# Patient Record
Sex: FEMALE | Race: WHITE | Employment: OTHER | URBAN - METROPOLITAN AREA
[De-identification: names, ages, dates, MRNs, and addresses within clinical notes are randomized per-mention and may not be internally consistent; named-entity substitution may affect disease eponyms.]

---

## 2020-11-22 ENCOUNTER — APPOINTMENT (OUTPATIENT)
Dept: CT IMAGING | Facility: CLINIC | Age: 75
DRG: 064 | End: 2020-11-22
Attending: EMERGENCY MEDICINE
Payer: COMMERCIAL

## 2020-11-22 ENCOUNTER — HOSPITAL ENCOUNTER (INPATIENT)
Facility: CLINIC | Age: 75
LOS: 7 days | Discharge: NURSING, REHAB OR LONG-TERM CARE | DRG: 064 | End: 2020-11-30
Attending: EMERGENCY MEDICINE | Admitting: INTERNAL MEDICINE
Payer: COMMERCIAL

## 2020-11-22 DIAGNOSIS — I63.9 CEREBROVASCULAR ACCIDENT (CVA), UNSPECIFIED MECHANISM (H): ICD-10-CM

## 2020-11-22 DIAGNOSIS — I48.91 ATRIAL FIBRILLATION WITH RVR (H): ICD-10-CM

## 2020-11-22 LAB
ALBUMIN SERPL-MCNC: 4.1 G/DL (ref 3.4–5)
ALP SERPL-CCNC: 47 U/L (ref 40–150)
ALT SERPL W P-5'-P-CCNC: 24 U/L (ref 0–50)
ANION GAP SERPL CALCULATED.3IONS-SCNC: 10 MMOL/L (ref 3–14)
AST SERPL W P-5'-P-CCNC: 32 U/L (ref 0–45)
BASOPHILS # BLD AUTO: 0 10E9/L (ref 0–0.2)
BASOPHILS NFR BLD AUTO: 0.1 %
BILIRUB SERPL-MCNC: 2 MG/DL (ref 0.2–1.3)
BUN SERPL-MCNC: 18 MG/DL (ref 7–30)
CALCIUM SERPL-MCNC: 9.2 MG/DL (ref 8.5–10.1)
CHLORIDE SERPL-SCNC: 107 MMOL/L (ref 94–109)
CK SERPL-CCNC: 659 U/L (ref 30–225)
CO2 SERPL-SCNC: 22 MMOL/L (ref 20–32)
CREAT SERPL-MCNC: 0.84 MG/DL (ref 0.52–1.04)
DIFFERENTIAL METHOD BLD: NORMAL
EOSINOPHIL # BLD AUTO: 0 10E9/L (ref 0–0.7)
EOSINOPHIL NFR BLD AUTO: 0 %
ERYTHROCYTE [DISTWIDTH] IN BLOOD BY AUTOMATED COUNT: 13.7 % (ref 10–15)
GFR SERPL CREATININE-BSD FRML MDRD: 68 ML/MIN/{1.73_M2}
GLUCOSE SERPL-MCNC: 147 MG/DL (ref 70–99)
HCT VFR BLD AUTO: 40.8 % (ref 35–47)
HGB BLD-MCNC: 13.8 G/DL (ref 11.7–15.7)
IMM GRANULOCYTES # BLD: 0 10E9/L (ref 0–0.4)
IMM GRANULOCYTES NFR BLD: 0.2 %
INR PPP: 1.14 (ref 0.86–1.14)
LYMPHOCYTES # BLD AUTO: 0.8 10E9/L (ref 0.8–5.3)
LYMPHOCYTES NFR BLD AUTO: 7.8 %
MCH RBC QN AUTO: 31.5 PG (ref 26.5–33)
MCHC RBC AUTO-ENTMCNC: 33.8 G/DL (ref 31.5–36.5)
MCV RBC AUTO: 93 FL (ref 78–100)
MONOCYTES # BLD AUTO: 0.7 10E9/L (ref 0–1.3)
MONOCYTES NFR BLD AUTO: 7.7 %
NEUTROPHILS # BLD AUTO: 8.1 10E9/L (ref 1.6–8.3)
NEUTROPHILS NFR BLD AUTO: 84.2 %
NRBC # BLD AUTO: 0 10*3/UL
NRBC BLD AUTO-RTO: 0 /100
PLATELET # BLD AUTO: 219 10E9/L (ref 150–450)
POTASSIUM SERPL-SCNC: 4.1 MMOL/L (ref 3.4–5.3)
PROT SERPL-MCNC: 7.2 G/DL (ref 6.8–8.8)
RBC # BLD AUTO: 4.38 10E12/L (ref 3.8–5.2)
SODIUM SERPL-SCNC: 139 MMOL/L (ref 133–144)
TROPONIN I SERPL-MCNC: <0.015 UG/L (ref 0–0.04)
WBC # BLD AUTO: 9.6 10E9/L (ref 4–11)

## 2020-11-22 PROCEDURE — 250N000009 HC RX 250: Performed by: EMERGENCY MEDICINE

## 2020-11-22 PROCEDURE — 96361 HYDRATE IV INFUSION ADD-ON: CPT

## 2020-11-22 PROCEDURE — 82550 ASSAY OF CK (CPK): CPT | Performed by: EMERGENCY MEDICINE

## 2020-11-22 PROCEDURE — 99285 EMERGENCY DEPT VISIT HI MDM: CPT | Mod: 25

## 2020-11-22 PROCEDURE — 70498 CT ANGIOGRAPHY NECK: CPT

## 2020-11-22 PROCEDURE — 0042T CT HEAD PERFUSION WITH CONTRAST: CPT

## 2020-11-22 PROCEDURE — 93005 ELECTROCARDIOGRAM TRACING: CPT

## 2020-11-22 PROCEDURE — 84484 ASSAY OF TROPONIN QUANT: CPT | Performed by: EMERGENCY MEDICINE

## 2020-11-22 PROCEDURE — 96376 TX/PRO/DX INJ SAME DRUG ADON: CPT | Mod: 59

## 2020-11-22 PROCEDURE — 85610 PROTHROMBIN TIME: CPT | Performed by: EMERGENCY MEDICINE

## 2020-11-22 PROCEDURE — 80053 COMPREHEN METABOLIC PANEL: CPT | Performed by: EMERGENCY MEDICINE

## 2020-11-22 PROCEDURE — 85025 COMPLETE CBC W/AUTO DIFF WBC: CPT | Performed by: EMERGENCY MEDICINE

## 2020-11-22 PROCEDURE — 70450 CT HEAD/BRAIN W/O DYE: CPT

## 2020-11-22 PROCEDURE — 258N000003 HC RX IP 258 OP 636: Performed by: EMERGENCY MEDICINE

## 2020-11-22 PROCEDURE — 250N000011 HC RX IP 250 OP 636: Performed by: EMERGENCY MEDICINE

## 2020-11-22 RX ORDER — IOPAMIDOL 755 MG/ML
120 INJECTION, SOLUTION INTRAVASCULAR ONCE
Status: COMPLETED | OUTPATIENT
Start: 2020-11-22 | End: 2020-11-22

## 2020-11-22 RX ORDER — SODIUM CHLORIDE 9 MG/ML
INJECTION, SOLUTION INTRAVENOUS CONTINUOUS
Status: DISCONTINUED | OUTPATIENT
Start: 2020-11-23 | End: 2020-11-25

## 2020-11-22 RX ORDER — SIMVASTATIN 40 MG
40 TABLET ORAL AT BEDTIME
Status: ON HOLD | COMMUNITY
Start: 2020-07-14 | End: 2020-11-30

## 2020-11-22 RX ORDER — ASPIRIN 300 MG/1
300 SUPPOSITORY RECTAL ONCE
Status: COMPLETED | OUTPATIENT
Start: 2020-11-22 | End: 2020-11-23

## 2020-11-22 RX ORDER — DILTIAZEM HYDROCHLORIDE 5 MG/ML
10 INJECTION INTRAVENOUS ONCE
Status: COMPLETED | OUTPATIENT
Start: 2020-11-22 | End: 2020-11-22

## 2020-11-22 RX ADMIN — SODIUM CHLORIDE 1000 ML: 9 INJECTION, SOLUTION INTRAVENOUS at 23:43

## 2020-11-22 RX ADMIN — DILTIAZEM HYDROCHLORIDE 10 MG: 5 INJECTION INTRAVENOUS at 23:55

## 2020-11-22 RX ADMIN — SODIUM CHLORIDE 100 ML: 9 INJECTION, SOLUTION INTRAVENOUS at 22:45

## 2020-11-22 RX ADMIN — IOPAMIDOL 120 ML: 755 INJECTION, SOLUTION INTRAVENOUS at 22:45

## 2020-11-23 ENCOUNTER — APPOINTMENT (OUTPATIENT)
Dept: MRI IMAGING | Facility: CLINIC | Age: 75
DRG: 064 | End: 2020-11-23
Attending: INTERNAL MEDICINE
Payer: COMMERCIAL

## 2020-11-23 ENCOUNTER — APPOINTMENT (OUTPATIENT)
Dept: SPEECH THERAPY | Facility: CLINIC | Age: 75
DRG: 064 | End: 2020-11-23
Attending: INTERNAL MEDICINE
Payer: COMMERCIAL

## 2020-11-23 ENCOUNTER — APPOINTMENT (OUTPATIENT)
Dept: OCCUPATIONAL THERAPY | Facility: CLINIC | Age: 75
DRG: 064 | End: 2020-11-23
Attending: INTERNAL MEDICINE
Payer: COMMERCIAL

## 2020-11-23 ENCOUNTER — NURSE TRIAGE (OUTPATIENT)
Dept: NURSING | Facility: CLINIC | Age: 75
End: 2020-11-23

## 2020-11-23 ENCOUNTER — APPOINTMENT (OUTPATIENT)
Dept: PHYSICAL THERAPY | Facility: CLINIC | Age: 75
DRG: 064 | End: 2020-11-23
Attending: INTERNAL MEDICINE
Payer: COMMERCIAL

## 2020-11-23 PROBLEM — I63.9 CEREBROVASCULAR ACCIDENT (CVA), UNSPECIFIED MECHANISM (H): Status: ACTIVE | Noted: 2020-11-23

## 2020-11-23 PROBLEM — I48.91 ATRIAL FIBRILLATION WITH RVR (H): Status: ACTIVE | Noted: 2020-11-23

## 2020-11-23 LAB
ALBUMIN UR-MCNC: NEGATIVE MG/DL
APPEARANCE UR: CLEAR
BILIRUB UR QL STRIP: NEGATIVE
COLOR UR AUTO: YELLOW
GLUCOSE BLDC GLUCOMTR-MCNC: 108 MG/DL (ref 70–99)
GLUCOSE BLDC GLUCOMTR-MCNC: 120 MG/DL (ref 70–99)
GLUCOSE BLDC GLUCOMTR-MCNC: 124 MG/DL (ref 70–99)
GLUCOSE BLDC GLUCOMTR-MCNC: 134 MG/DL (ref 70–99)
GLUCOSE BLDC GLUCOMTR-MCNC: 175 MG/DL (ref 70–99)
GLUCOSE BLDC GLUCOMTR-MCNC: 187 MG/DL (ref 70–99)
GLUCOSE UR STRIP-MCNC: NEGATIVE MG/DL
HGB UR QL STRIP: NEGATIVE
INTERPRETATION ECG - MUSE: NORMAL
KETONES UR STRIP-MCNC: 40 MG/DL
LABORATORY COMMENT REPORT: NORMAL
LEUKOCYTE ESTERASE UR QL STRIP: ABNORMAL
MUCOUS THREADS #/AREA URNS LPF: PRESENT /LPF
NITRATE UR QL: NEGATIVE
PH UR STRIP: 5 PH (ref 5–7)
RBC #/AREA URNS AUTO: 0 /HPF (ref 0–2)
SARS-COV-2 RNA SPEC QL NAA+PROBE: NEGATIVE
SARS-COV-2 RNA SPEC QL NAA+PROBE: NORMAL
SOURCE: ABNORMAL
SP GR UR STRIP: 1.01 (ref 1–1.03)
SPECIMEN SOURCE: NORMAL
SPECIMEN SOURCE: NORMAL
SQUAMOUS #/AREA URNS AUTO: <1 /HPF (ref 0–1)
UROBILINOGEN UR STRIP-MCNC: NORMAL MG/DL (ref 0–2)
WBC #/AREA URNS AUTO: 3 /HPF (ref 0–5)

## 2020-11-23 PROCEDURE — 97161 PT EVAL LOW COMPLEX 20 MIN: CPT | Mod: GP

## 2020-11-23 PROCEDURE — A9585 GADOBUTROL INJECTION: HCPCS | Performed by: INTERNAL MEDICINE

## 2020-11-23 PROCEDURE — 250N000012 HC RX MED GY IP 250 OP 636 PS 637: Performed by: INTERNAL MEDICINE

## 2020-11-23 PROCEDURE — 120N000001 HC R&B MED SURG/OB

## 2020-11-23 PROCEDURE — 97112 NEUROMUSCULAR REEDUCATION: CPT | Mod: GP

## 2020-11-23 PROCEDURE — 92526 ORAL FUNCTION THERAPY: CPT | Mod: GN | Performed by: SPEECH-LANGUAGE PATHOLOGIST

## 2020-11-23 PROCEDURE — 258N000003 HC RX IP 258 OP 636: Performed by: EMERGENCY MEDICINE

## 2020-11-23 PROCEDURE — 97530 THERAPEUTIC ACTIVITIES: CPT | Mod: GP

## 2020-11-23 PROCEDURE — 999N001017 HC STATISTIC GLUCOSE BY METER IP

## 2020-11-23 PROCEDURE — 96365 THER/PROPH/DIAG IV INF INIT: CPT

## 2020-11-23 PROCEDURE — 97166 OT EVAL MOD COMPLEX 45 MIN: CPT | Mod: GO

## 2020-11-23 PROCEDURE — 70553 MRI BRAIN STEM W/O & W/DYE: CPT

## 2020-11-23 PROCEDURE — 250N000009 HC RX 250: Performed by: INTERNAL MEDICINE

## 2020-11-23 PROCEDURE — 250N000013 HC RX MED GY IP 250 OP 250 PS 637: Performed by: EMERGENCY MEDICINE

## 2020-11-23 PROCEDURE — 255N000002 HC RX 255 OP 636: Performed by: INTERNAL MEDICINE

## 2020-11-23 PROCEDURE — 12001 RPR S/N/AX/GEN/TRNK 2.5CM/<: CPT

## 2020-11-23 PROCEDURE — 97530 THERAPEUTIC ACTIVITIES: CPT | Mod: GO

## 2020-11-23 PROCEDURE — 92610 EVALUATE SWALLOWING FUNCTION: CPT | Mod: GN | Performed by: SPEECH-LANGUAGE PATHOLOGIST

## 2020-11-23 PROCEDURE — 258N000003 HC RX IP 258 OP 636: Performed by: INTERNAL MEDICINE

## 2020-11-23 PROCEDURE — 250N000009 HC RX 250: Performed by: EMERGENCY MEDICINE

## 2020-11-23 PROCEDURE — 250N000013 HC RX MED GY IP 250 OP 250 PS 637: Performed by: INTERNAL MEDICINE

## 2020-11-23 PROCEDURE — 99222 1ST HOSP IP/OBS MODERATE 55: CPT | Performed by: PSYCHIATRY & NEUROLOGY

## 2020-11-23 PROCEDURE — 99223 1ST HOSP IP/OBS HIGH 75: CPT | Mod: AI | Performed by: INTERNAL MEDICINE

## 2020-11-23 PROCEDURE — 0HQ0XZZ REPAIR SCALP SKIN, EXTERNAL APPROACH: ICD-10-PCS | Performed by: EMERGENCY MEDICINE

## 2020-11-23 PROCEDURE — U0003 INFECTIOUS AGENT DETECTION BY NUCLEIC ACID (DNA OR RNA); SEVERE ACUTE RESPIRATORY SYNDROME CORONAVIRUS 2 (SARS-COV-2) (CORONAVIRUS DISEASE [COVID-19]), AMPLIFIED PROBE TECHNIQUE, MAKING USE OF HIGH THROUGHPUT TECHNOLOGIES AS DESCRIBED BY CMS-2020-01-R: HCPCS | Performed by: EMERGENCY MEDICINE

## 2020-11-23 PROCEDURE — 81001 URINALYSIS AUTO W/SCOPE: CPT | Performed by: EMERGENCY MEDICINE

## 2020-11-23 RX ORDER — ASPIRIN 300 MG/1
300 SUPPOSITORY RECTAL DAILY
Status: DISCONTINUED | OUTPATIENT
Start: 2020-11-23 | End: 2020-11-26

## 2020-11-23 RX ORDER — GADOBUTROL 604.72 MG/ML
10 INJECTION INTRAVENOUS ONCE
Status: CANCELLED | OUTPATIENT
Start: 2020-11-23 | End: 2020-11-23

## 2020-11-23 RX ORDER — DILTIAZEM HYDROCHLORIDE 5 MG/ML
10 INJECTION INTRAVENOUS ONCE
Status: COMPLETED | OUTPATIENT
Start: 2020-11-23 | End: 2020-11-23

## 2020-11-23 RX ORDER — NICOTINE POLACRILEX 4 MG
15-30 LOZENGE BUCCAL
Status: DISCONTINUED | OUTPATIENT
Start: 2020-11-23 | End: 2020-11-24

## 2020-11-23 RX ORDER — GADOBUTROL 604.72 MG/ML
7 INJECTION INTRAVENOUS ONCE
Status: COMPLETED | OUTPATIENT
Start: 2020-11-23 | End: 2020-11-23

## 2020-11-23 RX ORDER — ATORVASTATIN CALCIUM 40 MG/1
40 TABLET, FILM COATED ORAL
Status: DISCONTINUED | OUTPATIENT
Start: 2020-11-23 | End: 2020-11-30 | Stop reason: HOSPADM

## 2020-11-23 RX ORDER — LABETALOL HYDROCHLORIDE 5 MG/ML
10-40 INJECTION, SOLUTION INTRAVENOUS EVERY 10 MIN PRN
Status: DISCONTINUED | OUTPATIENT
Start: 2020-11-23 | End: 2020-11-30 | Stop reason: HOSPADM

## 2020-11-23 RX ORDER — METOPROLOL TARTRATE 25 MG/1
25 TABLET, FILM COATED ORAL 2 TIMES DAILY
Status: DISCONTINUED | OUTPATIENT
Start: 2020-11-23 | End: 2020-11-24

## 2020-11-23 RX ORDER — DEXTROSE MONOHYDRATE 25 G/50ML
25-50 INJECTION, SOLUTION INTRAVENOUS
Status: DISCONTINUED | OUTPATIENT
Start: 2020-11-23 | End: 2020-11-24

## 2020-11-23 RX ADMIN — ATORVASTATIN CALCIUM 40 MG: 40 TABLET, FILM COATED ORAL at 22:01

## 2020-11-23 RX ADMIN — GADOBUTROL 7 ML: 604.72 INJECTION INTRAVENOUS at 17:20

## 2020-11-23 RX ADMIN — SODIUM CHLORIDE: 9 INJECTION, SOLUTION INTRAVENOUS at 00:20

## 2020-11-23 RX ADMIN — DILTIAZEM HYDROCHLORIDE 10 MG: 5 INJECTION INTRAVENOUS at 00:28

## 2020-11-23 RX ADMIN — METOPROLOL TARTRATE 25 MG: 25 TABLET, FILM COATED ORAL at 22:02

## 2020-11-23 RX ADMIN — ASPIRIN 325 MG: 325 TABLET, COATED ORAL at 10:55

## 2020-11-23 RX ADMIN — SODIUM CHLORIDE: 9 INJECTION, SOLUTION INTRAVENOUS at 08:45

## 2020-11-23 RX ADMIN — ASPIRIN 300 MG: 300 SUPPOSITORY RECTAL at 00:06

## 2020-11-23 RX ADMIN — DILTIAZEM HYDROCHLORIDE 10 MG/HR: 5 INJECTION INTRAVENOUS at 13:29

## 2020-11-23 RX ADMIN — INSULIN ASPART 1 UNITS: 100 INJECTION, SOLUTION INTRAVENOUS; SUBCUTANEOUS at 10:57

## 2020-11-23 RX ADMIN — INSULIN ASPART 1 UNITS: 100 INJECTION, SOLUTION INTRAVENOUS; SUBCUTANEOUS at 13:36

## 2020-11-23 RX ADMIN — DILTIAZEM HYDROCHLORIDE 5 MG/HR: 5 INJECTION INTRAVENOUS at 00:07

## 2020-11-23 ASSESSMENT — ACTIVITIES OF DAILY LIVING (ADL)
ADLS_ACUITY_SCORE: 18
PREVIOUS_RESPONSIBILITIES: MEAL PREP;HOUSEKEEPING;LAUNDRY;SHOPPING;YARDWORK;MEDICATION MANAGEMENT;FINANCES;DRIVING

## 2020-11-23 NOTE — PROGRESS NOTES
11/23/20 1028   General Information   Onset of Illness/Injury or Date of Surgery 11/22/20   Referring Physician Dr. Velázquez   Patient/Family Therapy Goal Statement (SLP) Patient is thirsty and hungry   Pertinent History of Current Problem Acute ischemic stroke:  This is a 75-year-old female with history of diabetes, hyperlipidemia, new onset atrial fibrillation who presented with acute ischemic stroke, most likely secondary to embolism because of her atrial fibrillation.  The CT scan shows evidence of small to acute infarct involving the posterior right insular cortex extending into the lateral right basal ganglia and right corona radiata.    General Observations Pleasant and slow to repsond.   Past History of Dysphagia No history of dysphagia that is documented.    Oral Motor   Oral Musculature generally intact   Structural Abnormalities none present   Dentition (Oral Motor)   Dentition (Oral Motor) natural dentition;adequate dentition   Facial Symmetry (Oral Motor)   Facial Symmetry (Oral Motor) left side impairment   Left Side Facial Asymmetry minimal impairment   Lip Function (Oral Motor)   Lip Range of Motion (Oral Motor) retraction impairment   Protrusion, Lip Range of Motion left side;minimal impairment   Retraction, Lip Range of Motion minimal impairment;left side   Tongue Function (Oral Motor)   Tongue ROM (Oral Motor) elevation is impaired;retraction is impaired;lateralization is impaired   Elevation, Tongue ROM Impairment (Oral Motor) minimal impairment   Retraction, Tongue ROM Impairment (Oral Motor) minimal impairment   Lateralization, Tongue ROM Impairment (Oral Motor) minimal impairment   Vocal Quality/Secretion Management (Oral Motor)   Vocal Quality (Oral Motor) WFL   General Swallowing Observations   Current Diet/Method of Nutritional Intake (General Swallowing Observations, NIS) NPO   Respiratory Support (General Swallowing Observations) none   Clinical Swallow Evaluation   Feeding Assistance no  assistance needed   Clinical Swallow Eval: Thin Liquid Texture Trial   Mode of Presentation, Thin Liquids spoon;cup;self-fed;fed by clinician   Volume of Liquid or Food Presented Ice chips and swallows via the cup.    Oral Phase of Swallow Premature pharyngeal entry   Pharyngeal Phase of Swallow impaired;coughing/choking;reduction in laryngeal movement   Diagnostic Statement Overt Sx of aspiration with thin liquids.    Clinical Swallow Evaluation: Nectar-Thick Liquid Texture Trial   Mode of Presentation, Nectar spoon;cup;self-fed;fed by clinician   Volume of Nectar Presented 4 oz of juice   Oral Phase, Nectar Premature pharyngeal entry   Pharyngeal Phase, Nectar impaired;reduction in laryngeal movement;repeated swallows   Diagnostic Statement No overt Sx of aspiration.   Clinical Swallow Evaluation: Puree Solid Texture Trial   Mode of Presentation, Puree spoon;fed by clinician   Volume of Puree Presented 2 oz of apple sauce   Oral Phase, Puree Poor AP movement;Premature pharyngeal entry   Pharyngeal Phase, Puree impaired;reduction in laryngeal movement;repeated swallows   Clinical Swallow Evaluation: Solid Food Texture Trial   Mode of Presentation, Solid spoon;fed by clinician   Volume of Solid Food Presented 1/2 shiloh cracker   Oral Phase, Solid Poor AP movement;Residue in oral cavity;Premature pharyngeal entry   Oral Residue, Solid left anterior lateral sulci;mid posterior tongue   Pharyngeal Phase, Solid impaired;reduction in laryngeal movement;repeated swallows   Diagnostic Statement Mild oral residue that clears with a liquid rinse.     Swallowing Recommendations   Diet Consistency Recommendations nectar-thick liquids;dysphagia level 2 (mechanically altered)   Supervision Level for Intake distant supervision needed   Mode of Delivery Recommendations bolus size, small;food moistened;no straws;slow rate of intake   Postural Recommendations none   Swallowing Maneuver Recommendations alternate food and liquid  intake;extra swallow   Monitoring/Assistance Required (Eating/Swallowing) check mouth frequently for oral residue/pocketing;stop eating activities when fatigue is present;monitor for cough or change in vocal quality with intake   Recommended Feeding/Eating Techniques (Swallow Eval) maintain upright sitting position for eating;maintain upright posture during/after eating for 30 minutes;set-up and prepare tray   Medication Administration Recommendations, Swallowing (SLP) Crush if small could try in apple sauce.    General Therapy Interventions   Planned Therapy Interventions Dysphagia Treatment   SLP Therapy Assessment/Plan   Criteria for Skilled Therapeutic Interventions Met (SLP Eval) yes   SLP Diagnosis Dysphagia   Rehab Potential (SLP Eval) good, to achieve stated therapy goals   Therapy Frequency (SLP Eval) daily   Predicted Duration of Therapy Intervention (SLP Eval) 1 week   Comment, Therapy Assessment/Plan (SLP) Patient presents with moderate oral and pharyngeal dysphagia at bedside secondary to a right CVA. She demonstrated premature of thin liquids with overt Sx of aspiration with an immediate cough via the cup. Mastication is slow but sufficient for a solid with reduced AP movement and delay. Mild oral residue on the left mid tongue. Patient at risk for aspiration with thin liquids and solids for suspercted pharyngeal retention.    Therapy Plan Review/Discharge Plan (SLP)   Therapy Plan Review (SLP) evaluation/treatment results reviewed;care plan/treatment goals reviewed;risks/benefits reviewed;current/potential barriers reviewed;participants voiced agreement with care plan;participants included;patient   SLP Discharge Planning    SLP Discharge Recommendation (DC Rec) Acute Rehab Center-Motivated patient will benefit from intensive, interdisciplinary therapy.  Anticipate will be able to tolerate 3 hours of therapy per day   SLP Rationale for DC Rec Patient will need short term ST needs for swallowing and  complete a speech/language evaluation as appropriate.    SLP Brief overview of current status  Patient with moderate dysphagia recommend:  DDL 2 with nectar thick liquids. 2. Upright, small bites/sips, slow rate, check for oral residue and alternate liquids/solids. Crush meds if large small can try in apple sauce.     Total Evaluation Time   Total Evaluation Time (Minutes) 20

## 2020-11-23 NOTE — PROGRESS NOTES
11/23/20 1536   Quick Adds   Quick Adds Vestibular Eval   Type of Visit Initial PT Evaluation   Living Environment   People in home alone   Current Living Arrangements house   Home Accessibility stairs to enter home;stairs within home   Living Environment Comments Patient lives alone, 7 stairs to enter with rail, and a flight to basement that patient uses.    Self-Care   Usual Activity Tolerance moderate   Current Activity Tolerance fair   Equipment Currently Used at Home none   Activity/Exercise/Self-Care Comment PLOF: [place] no AD   Disability/Function   Walking or Climbing Stairs Difficulty yes   Walking or Climbing Stairs ambulation difficulty, assistance 1 person;stair climbing difficulty, assistance 1 person;stair climbing difficulty, requires equipment;transferring difficulty, assistance 1 person;transferring difficulty, requires equipment   Toileting yes   Toileting Assistance toileting difficulty, requires equipment;toileting difficulty, assistance 1 person   Fall history within last six months yes   Number of times patient has fallen within last six months 1   Change in Functional Status Since Onset of Current Illness/Injury yes   General Information   Onset of Illness/Injury or Date of Surgery 11/22/20   Referring Physician Ambrose Velázquez MD   Patient/Family Therapy Goals Statement (PT) To go to the bathroom.    Pertinent History of Current Problem (include personal factors and/or comorbidities that impact the POC) Ischemic CVA - R insular cortex, R BG, R corona radiata. DM, HLD, new Afib with RVR.   Existing Precautions/Restrictions fall   Weight-Bearing Status - LUE full weight-bearing  (all)   Cognition   Affect/Mental Status (Cognition) confused   Follows Commands (Cognition) follows one-step commands;75-90% accuracy;increased processing time needed;physical/tactile prompts required;repetition of directions required;verbal cues/prompting required   Safety Deficit (Cognition) moderate deficit    Pain Assessment   Patient Currently in Pain No   Posture    Posture Comments WFL, flexed.    Range of Motion (ROM)   ROM Comment WFL   Strength Comprehensive (MMT)   Comment, General Manual Muscle Testing (MMT) Assessment LE MTT: 4+ R hip abd/add/flx L 4 hip abd/add/flx, knee ext 4+L 5R, knee flx 4L 5R, DF 4+L 5R, inv 4-L 5R, ev 4+L 5R.    Transfers   Transfer Safety Comments CGA sit-stand (recliner, toilet with seat raiser with armrests), pivots CGA-Vinnie pending LOB. Pt with limited bioawareness.    Gait/Stairs (Locomotion)   Distance in Feet (Required for LE Total Joints) 300' no AD Vinnie LOB with head turns, 180deg turns, EC, tandem.    Comment (Gait/Stairs) 6 stairs 1 rail Vinnie step-to pattern slow with posteiror LOBs needing assist for falls prevention.    Balance   Balance Comments FGA 6/30 high falls risk (< 23).    Coordination   Coordination Comments WFL alt toe taps, WFL alt heel taps, impaired L side with alt heel/toe taps wit toe lift errors causing coordination errors.  Motor control also impaired L with dyscontrolled forward open-chair cycling and pt unable to motor plan retro open-chain cycling despite PNF PROM/AAROM/AROM tactile and VC.    Muscle Tone   Muscle Tone Comments WFL on observation only.   Oculomotor Exam   VOR Comments Retinal slips - slowed, horizontally > vertically. Pt denies dizziness.        mCTSIB: EO stable SBA, EC stable Vinnie 17sec, EO pillow CGA, EC pillow CGA.   Clinical Impression   Criteria for Skilled Therapeutic Intervention yes, treatment indicated   PT Diagnosis (PT) Impaired mobility   Influenced by the following impairments Impaired strength, balance, activity tolerance, coordination, motor control, vestib.    Functional limitations due to impairments Impaired mobility   Clinical Presentation Stable/Uncomplicated   Clinical Decision Making (Complexity) low complexity   Therapy Frequency (PT) 2x/day   Predicted Duration of Therapy Intervention (days/wks) 5 dasys    Planned Therapy Interventions (PT) balance training;bed mobility training;gait training;home exercise program;motor coordination training;neuromuscular re-education;patient/family education;postural re-education;stair training;strengthening;transfer training   Anticipated Equipment Needs at Discharge (PT) walker, rolling;shower chair;raised toilet seat;gait belt   Risk & Benefits of therapy have been explained evaluation/treatment results reviewed;care plan/treatment goals reviewed;risks/benefits reviewed;current/potential barriers reviewed;participants voiced agreement with care plan;participants included;patient   PT Discharge Planning    PT Discharge Recommendation (DC Rec) Acute Rehab Center-Motivated patient will benefit from intensive, interdisciplinary therapy.  Anticipate will be able to tolerate 3 hours of therapy per day   PT Rationale for DC Rec Below independent baseline, good prognosis with mobility, question is from the cognitive perspective to guide DC planning from rehab - defer to OT & SLP.    PT Brief overview of current status  Sit-stands CGA, pivots & gait no AD Vinnie, stairs 1 rail Vinnie step-to, FGA 6/30 - high falls risk when < 23. Impaired motor control, coordination, vestib, L hemiparesis.   Total Evaluation Time   Total Evaluation Time (Minutes) 15

## 2020-11-23 NOTE — CONSULTS
Care Management Initial Consult    General Information  Assessment completed with: Patient, (Cristy)  Type of CM/SW Visit: Initial Assessment  Primary Care Provider verified and updated as needed:     Readmission within the last 30 days:        Reason for Consult: discharge planning  Advance Care Planning:            Communication Assessment  Patient's communication style: spoken language (English or Bilingual)    Hearing Difficulty or Deaf: no        Cognitive  Cognitive/Neuro/Behavioral: .WDL except  Level of Consciousness: alert  Arousal Level: opens eyes spontaneously  Orientation: oriented x 4  Mood/Behavior: calm;cooperative  Best Language: 0 - No aphasia  Speech: slow    Living Environment:   People in home: alone     Current living Arrangements: house      Able to return to prior arrangements: yes following rehab.       Family/Social Support:  Care provided by: self  Provides care for:    Marital Status:   Children          Description of Support System: Supportive;Involved    Support Assessment: Lacks necessary supervision and assistance  Pt's daughter, Radha lives in [place]. She has been there for 14 years.  Pt has been independent in all her ADL's prior to hospitalization.    Current Resources:   Skilled Home Care Services:    Community Resources:  none  Equipment currently used at home: none  Supplies currently used at home:  none    Employment/Financial:  Employment Status: retired recently this past March when covid started. She worked at a [employer] and retired because she felt unsafe in that position with the Covid 19.       Financial Concerns:             Lifestyle & Psychosocial Needs:        Socioeconomic History     Marital status:      Spouse name: Not on file     Number of children: Not on file     Years of education: Not on file     Highest education level: Not on file          Functional Status:  Prior to admission patient needed assistance:        Mental  Health Status:        Chemical Dependency Status:            Values/Beliefs:  Spiritual, Cultural Beliefs, Jehovah's witness Practices, Values that affect care:                 Additional Information:  SW met with pt to discuss recommendations of therapy staff and hospitalist for [facility] placement. She is agreeable to this but lives alone and her daughter lives in [place]. They face time everyday and that is how pt was discovered at home on the floor; her daughter called for a welfare check.  Pt states she has a friend Ju who checks on her from time to time and would be of some assistance. Pt would like to look into hiring assistance in her home post rehab as needed. She understands that this would be private pay for an aide to stay with her over a period of days to a week. She wouild prefer to to that and have Ju assist with some of the organization in getting her home. Pt is very interested in ARU placement.  [facility] referral sent via [facility].    GABRIEL Domínguez  [facility]  [phone]

## 2020-11-23 NOTE — ED NOTES
"Patient self reported \"not feeling well\" today stating \"flu-lilke symptoms, chills, weakness.\"  "

## 2020-11-23 NOTE — PROGRESS NOTES
11/23/20 1050   Quick Adds   Type of Visit Initial Occupational Therapy Evaluation   Living Environment   People in home alone   Current Living Arrangements house   Home Accessibility stairs to enter home;stairs within home   Living Environment Comments Patient lives alone, stairs to enter and a flight to basement that patient uses.    Self-Care   Usual Activity Tolerance good   Current Activity Tolerance fair   Equipment Currently Used at Home none   General Information   Onset of Illness/Injury or Date of Surgery 11/22/20   Referring Physician Ambrose Velázquez MD   Patient/Family Therapy Goal Statement (OT) get stronger    Additional Occupational Profile Info/Pertinent History of Current Problem 75-year-old female with history of diabetes, hyperlipidemia, new onset atrial fibrillation who presented with acute ischemic stroke, most likely secondary to embolism because of her atrial fibrillation.  The CT scan shows evidence of small to acute infarct involving the posterior right insular cortex extending into the lateral right basal ganglia and right corona radiata.   Existing Precautions/Restrictions fall   Limitations/Impairments safety/cognitive   General Observations and Info Activity: Up with Assist    Cognitive Status Examination   Orientation Status orientation to person, place and time   Visual Perception   Visual Attention Patient demo'ing L neglect    Sensory   Sensory Comments L UE appears mildly impaired    Pain Assessment   Patient Currently in Pain No   Integumentary/Edema   Integumentary/Edema Comments generalized swelling   Posture   Posture not impaired   Range of Motion Comprehensive   General Range of Motion no range of motion deficits identified   Strength Comprehensive (MMT)   Comment, General Manual Muscle Testing (MMT) Assessment R UE=WFL, L UE MMT 4/5    Coordination   Upper Extremity Coordination Left UE impaired   Bed Mobility   Bed Mobility supine-sit   Supine-Sit [place] (Bed  Mobility) minimum assist (75% patient effort);verbal cues   Assistive Device (Bed Mobility) bed rails   Transfers   Transfers sit-stand transfer;bed-chair transfer   Transfer Skill: Bed to Chair/Chair to Bed   Bed-Chair [place] (Transfers) minimum assist (75% patient effort)   Sit-Stand Transfer   Sit-Stand [place] (Transfers) minimum assist (75% patient effort)   Activities of Daily Living   BADL Assessment/Intervention lower body dressing   Lower Body Dressing Assessment/Training   [place] Level (Lower Body Dressing) maximum assist (25% patient effort)   Instrumental Activities of Daily Living (IADL)   Previous Responsibilities meal prep;housekeeping;laundry;shopping;yardwork;medication management;finances;driving   Clinical Impression   Criteria for Skilled Therapeutic Interventions Met (OT) yes;meets criteria;skilled treatment is necessary   OT Diagnosis decreased independence in ADLs/IADLs    OT Problem List-Impairments impacting ADL problems related to;activity tolerance impaired;balance;cognition;range of motion (ROM);strength;vision   Assessment of Occupational Performance 3-5 Performance Deficits   Identified Performance Deficits dressing, bathing, toileting, home management, social skills    Planned Therapy Interventions (OT) ADL retraining;IADL retraining;balance training;cognition;neuromuscular re-education;motor coordination training;stretching;transfer training;visual perception;progressive activity/exercise   Clinical Decision Making Complexity (OT) moderate complexity   Therapy Frequency (OT) Daily   Predicted Duration of Therapy 4 days    Risks and Benefits of Treatment have been explained. Yes   Patient, Family & other staff in agreement with plan of care Yes   OT Discharge Planning    OT Discharge Recommendation (DC Rec) Acute Rehab Center-Motivated patient will benefit from intensive, interdisciplinary therapy.  Anticipate will be able to tolerate 3 hours of therapy per day   OT  Rationale for DC Rec Patient below baseline, independent prior. Patient is good rehab candidate - anticipate to tolerate 3+hrs of therapy.    Total Evaluation Time (Minutes)   Total Evaluation Time (Minutes) 10

## 2020-11-23 NOTE — PROGRESS NOTES
Hospitalist update note:    Patient seen and examined.  Still has weakness on the left but states it is improved somewhat.  No other complaints at this time.    Plan:   - MRI brain is ordered  - Echocardiogram is pending   - Neurology consulted  - PT/OT evaluated and recommending ARU       Demetris Carlson DO   [facility]

## 2020-11-23 NOTE — CONSULTS
"  [facility]    Stroke Consult Note    Reason for Consult: \"CVA\"    Chief Complaint: Fall     HPI  Abbey Melgoza is a 75 year old female with PMH of HLD and type 2 DM, who presented to the emergency department via EMS after she was found down in her home with left sided weakness. Patient's daughter reports speaking with the patient last on Saturday, but could not get a hold of her yesterday which lead to her calling the police for a welfare check. Patient was unable to report if she woke up with symptoms, but reported she felt like she had flu like symptoms. In the ED she was found to be in rapid atrial fibrillation. Stroke code was initiated, CT/CTA/CTP were ordered. CT revealed evidence of a moderate sized infarct involving the right insular cortex extending into the lateral right basal ganglia and right corona radiata. CTA revealed occulusion involving the right M2/M3 inferior division. Today patient was sitting up in chair during exam, she reports no recollection of a fall. She states she thinks she hit her head in the kitchen when she may have fell. Patient does not endorse current headache, visual disturbances, focal weakness, or change in sensations at this time. Patient COVID test returned negative.     Stroke Evaluation summarized:  MRI/Head CT: NCCT per HPI, MRI brain ordered, pending results   Intracranial Vascular Imaging: per HPI   Cervical Carotid and Vertebral Artery Vascular Imaging: mild atherosclerotic plaque in the right carotid bifurcation, atherosclerotic plaque with less than 50% stenosis in the left ICA   Echocardiogram: ordered, pending results   EKG/Telemetry: ordered, pending results   LDL: pending  A1c: pending   Troponin: <0.015  Other testing: Not Applicable    Impression  Ischemic Stroke due to cardioembolism secondary to new diagnosis of atrial fibrillation      Recommendations  - Continue  mg daily for now, pharmacy liaison consult placed regarding " "anticoagulation    - MRI brain, Echo, labs pending   - OK to discontinue permissive HTN protocol and begin PO medication per primary service     Patient Follow-up    - final recommendation pending work-up    Thank you for this consult. We will continue to follow.     Roseanne Jane PA-C   Neurology  To page me or covering stroke neurology team member, click here: AMCOM   Choose \"On Call\" tab at top, then search dropdown box for \"Neurology Adult\", select location, press Enter, then look for stroke/neuro ICU/telestroke.  _____________________________________________________    Past Medical History   History reviewed. No pertinent past medical history.  Past Surgical History   History reviewed. No pertinent surgical history.  Medications   Home Meds  Prior to Admission medications    Medication Sig Start Date End Date Taking? Authorizing Provider   metFORMIN (GLUCOPHAGE) 1000 MG tablet Take 1,000 mg by mouth 2 times daily (with meals) 7/14/20  Yes Unknown, Entered By History   simvastatin (ZOCOR) 40 MG tablet Take 40 mg by mouth At Bedtime 7/14/20  Yes Unknown, Entered By History       Scheduled Meds    aspirin  325 mg Oral Daily    Or     aspirin  300 mg Rectal Daily     atorvastatin  40 mg Oral or NG Tube Daily at 8 pm     insulin aspart  1-4 Units Subcutaneous Q4H       Infusion Meds    dilTIAZem HCl-Sodium Chloride 10 mg/hr (11/23/20 0541)     - MEDICATION INSTRUCTIONS -       sodium chloride Stopped (11/23/20 0136)       PRN Meds  glucose **OR** dextrose **OR** glucagon, labetalol, - MEDICATION INSTRUCTIONS -    Allergies   No Known Allergies  Family History   History reviewed. No pertinent family history.  Social History   Social History     Tobacco Use     Smoking status: None   Substance Use Topics     Alcohol use: None     Drug use: None       Review of Systems   The 10 point Review of Systems is negative other than noted in the HPI or here.        PHYSICAL EXAMINATION   Temp:  [98.4  F (36.9  C)-99.6  F (37.6 "  C)] 98.4  F (36.9  C)  Pulse:  [] 81  Resp:  [11-21] 16  BP: ()/() 124/76  SpO2:  [94 %-100 %] 96 %    Neurologic  Mental Status: alert, oriented x 3, follows commands, speech clear and fluent  Cranial Nerves:  visual fields intact, PERRL, EOMI with normal smooth pursuit, facial sensation intact and symmetric, facial movements symmetric, hearing not formally tested but intact to conversation, no dysarthria, shoulder shrug strong bilaterally, tongue protrusion midline  Motor:  normal muscle tone and bulk, no abnormal movements, able to move all limbs spontaneously, slight decrease in  strength and + drift in LUE, mild proximal weakness in LUE, slight decreased strength in LLE   Reflexes:  deferred  Sensory:  decreased sensationi in LUE and LLE, extinction to simultaneous sensory   Coordination: normal finger-to-nose bilaterally without dysmetria  Station/Gait:  deferred    Dysphagia Screen  Per Nursing    Stroke Scales    NIHSS  Interval baseline (11/23/20 0200)   Interval Comments     1a. Level of Consciousness 0-->Alert, keenly responsive   1b. LOC Questions 0-->Answers both questions correctly   1c. LOC Commands 0-->Performs both tasks correctly   2.   Best Gaze 0-->Normal   3.   Visual 0-->No visual loss   4.   Facial Palsy 0-->Normal symmetrical movements   5a. Motor Arm, Left 1-->Drift, limb holds 90 (or 45) degrees, but drifts down before full 10 seconds, does not hit bed or other support   5b. Motor Arm, Right 0-->No drift, limb holds 90 (or 45) degrees for full 10 secs   6a. Motor Leg, Left 1-->Drift, leg falls by the end of the 5-sec period but does not hit bed   6b. Motor Leg, right 0-->No drift, leg holds 30 degree position for full 5 secs   7.   Limb Ataxia 0-->Absent   8.   Sensory 1-->Mild-to-moderate sensory loss, patient feels pinprick is less sharp or is dull on the affected side, or there is a loss of superficial pain with pinprick, but patient is aware of being touched   9.    Best Language 0-->No aphasia, normal   10. Dysarthria 0-->Normal   11. Extinction and Inattention  1-->Visual, tactile, auditory, spatial, or personal inattention or extinction to bilateral simultaneous stimulation in one of the sensory modalities   Total 4 (11/23/20 1652)       Imaging  I personally reviewed all imaging; relevant findings per HPI.    Labs Data   CBC  Recent Labs   Lab 11/22/20 2226   WBC 9.6   RBC 4.38   HGB 13.8   HCT 40.8        Basic Metabolic Panel   Recent Labs   Lab 11/22/20 2226      POTASSIUM 4.1   CHLORIDE 107   CO2 22   BUN 18   CR 0.84   *   KAROLYN 9.2     Liver Panel  Recent Labs   Lab 11/22/20 2226   PROTTOTAL 7.2   ALBUMIN 4.1   BILITOTAL 2.0*   ALKPHOS 47   AST 32   ALT 24     INR    Recent Labs   Lab Test 11/22/20 2226   INR 1.14      Lipid Profile  No lab results found.  A1C  No lab results found.  Troponin I    Recent Labs   Lab 11/22/20 2226   TROPI <0.015          Stroke Code / Stroke Consult Data Data This was a non-emergent, non-tele stroke consult.

## 2020-11-23 NOTE — CONSULTS
[facility]    Stroke Consult Note    Reason for Consult: Stroke Code    Chief Complaint: Fall      HPI  Abbey Melgoza is a 75 year old female with history of DMII who presents as a stroke code after a fall. She reportedly spoke to her daughter at 2000 yesterday evening and when she went to bed she felt normal, then when she woke up today she is not sure if she had symptoms, but she reports feeling like she had the flu. Then, when her daughter did not hear from her today, she requested a welfare check and the police found her down in her home with left sided weakness. In the ED she is found to be in rapid atrial fibrillation.    TPA Treatment   Not given due to established infarct on imaging and unclear or unfavorable risk-benefit profile for extended window thrombolysis beyond the conventional 4.5 hour time window.     TIMELESS considered, LKN beyond 24 hours and CTP profile not compatible with study parameters.    Endovascular Treatment  Proximal vessel occlusion present, but endovascular treatment not initiated due to M2/3 occlusion not amenable to retrieval    Impression  Ischemic Stroke due to cardioembolism- new dx atrial fibrillation    Recommendations  Acute Ischemic Stroke (without tPA) Recommendations  - Neurochecks Q 4 hours  - Permissive HTN; labetalol PRN for SBP > 220  - Daily aspirin 300 mg NC until cleared by SLP for oral meds for secondary stroke prevention  - Statin: atorvastatin 40 mg daily, adjustment pending LDL  - MRI Stroke Protocol  - Telemetry, EKG  - Bedside Glucose Monitoring  - A1c, Lipid Panel, Troponin x 3  - PT/OT/SLP  - Stroke Education  - Euthermia, Euglycemia    Patient Follow-up    - final recommendation pending work-up    Thank you for this consult. We will continue to follow.     The overnight Stroke Staff is Dr. Delgado. The patient will be seen on day shift with Dr. James.    Tess Strong MD  Vascular Neurology Fellow  To page me or covering  "stroke neurology team member, click here: AMCOM   Choose \"On Call\" tab at top, then search dropdown box for \"Neurology Adult\", select location, press Enter, then look for stroke/neuro ICU/telestroke.    ______________________________________________________    Past Medical History   No past medical history on file.  Past Surgical History   No past surgical history on file.  Medications   Home Meds  Prior to Admission medications    Medication Sig Start Date End Date Taking? Authorizing Provider   metFORMIN (GLUCOPHAGE) 1000 MG tablet Take 1,000 mg by mouth 2 times daily (with meals) 7/14/20  Yes Unknown, Entered By History   simvastatin (ZOCOR) 40 MG tablet Take 40 mg by mouth At Bedtime 7/14/20  Yes Unknown, Entered By History       Scheduled Meds    sodium chloride 0.9%  1,000 mL Intravenous Once     aspirin  300 mg Rectal Once     diltiazem  10 mg Intravenous Once       Infusion Meds    dilTIAZem HCl-Sodium Chloride       [START ON 11/23/2020] sodium chloride         PRN Meds      Allergies   Not on File  Family History   No family history on file.  Social History   Social History     Tobacco Use     Smoking status: Not on file   Substance Use Topics     Alcohol use: Not on file     Drug use: Not on file       Review of Systems   The 10 point Review of Systems is negative other than noted in the HPI or here.       PHYSICAL EXAMINATION  Temp:  [98.7  F (37.1  C)] 98.7  F (37.1  C)  Pulse:  [] 156  Resp:  [15-16] 16  BP: (121-137)/() 121/70  SpO2:  [94 %-100 %] 94 %     Neurologic  Mental Status:  alert, oriented x 3, follows commands, speech clear and fluent, naming and repetition normal  Cranial Nerves:  visual fields intact (tested by nurse), facial sensation intact and symmetric (tested by nurse), hearing not formally tested but intact to conversation, no dysarthria, shoulder shrug equal bilaterally, tongue protrusion midline, R gaze deviation, able to acheive midline volitionally, L nasolabial fold " flattening, extinguishes double simultaneous visual stimuli on L, visual fields full when tested independently  Motor:  no abnormal movements, LUE pronator and downward drift, LLE downward drift, RUE and RLE intact  Reflexes:  unable to test (telestroke)  Sensory:  Absent sensation on L with eyes closed, intact on R  Coordination:  normal finger-to-nose and heel-to-shin bilaterally without dysmetria, rapid alternating movements symmetric, no ataxia out of proportion to weakness on L  Station/Gait:  unable to test due to telestroke      Dysphagia Screen  Dysarthria or facial droop present - Maintain NPO, consult SLP    Stroke Scales    NIHSS  Interval baseline (11/22/20 2334)   Interval Comments     1a. Level of Consciousness 0-->Alert, keenly responsive   1b. LOC Questions 0-->Answers both questions correctly   1c. LOC Commands 0-->Performs both tasks correctly   2.   Best Gaze 1-->Partial gaze palsy, gaze is abnormal in one or both eyes, but forced deviation or total gaze paresis is not present   3.   Visual 0-->No visual loss   4.   Facial Palsy 1-->Minor paralysis (flattened nasolabial fold, asymmetry on smiling)   5a. Motor Arm, Left 1-->Drift, limb holds 90 (or 45) degrees, but drifts down before full 10 seconds, does not hit bed or other support   5b. Motor Arm, Right 0-->No drift, limb holds 90 (or 45) degrees for full 10 secs   6a. Motor Leg, Left 1-->Drift, leg falls by the end of the 5-sec period but does not hit bed   6b. Motor Leg, right 0-->No drift, leg holds 30 degree position for full 5 secs   7.   Limb Ataxia 0-->Absent   8.   Sensory 2-->Severe to total sensory loss, patient is not aware of being touched in the face, arm, and leg   9.   Best Language 0-->No aphasia, normal   10. Dysarthria 0-->Normal   11. Extinction and Inattention  1-->Visual, tactile, auditory, spatial, or personal inattention or extinction to bilateral simultaneous stimulation in one of the sensory modalities   Total 7  (11/22/20 2334)       Imaging  I personally reviewed all imaging; relevant findings per HPI.     Lab Results Data   CBC  Recent Labs   Lab 11/22/20 2226   WBC 9.6   RBC 4.38   HGB 13.8   HCT 40.8        Basic Metabolic Panel    Recent Labs   Lab 11/22/20 2226      POTASSIUM 4.1   CHLORIDE 107   CO2 22   BUN 18   CR 0.84   *   KAROLYN 9.2     Liver Panel  Recent Labs   Lab 11/22/20 2226   PROTTOTAL 7.2   ALBUMIN 4.1   BILITOTAL 2.0*   ALKPHOS 47   AST 32   ALT 24     INR    Recent Labs   Lab Test 11/22/20 2226   INR 1.14      Lipid Profile  No lab results found.  A1C  No lab results found.  Troponin I    Recent Labs   Lab 11/22/20 2226   TROPI <0.015          Stroke Code / Stroke Consult Data Data   Stroke Code Data  (for stroke code with tele)  Stroke code activated 11/22/20 2232   First stroke provider response 11/22/20   2232   Video start time 11/22/20   2315   Video end time 11/22/20   2333   Last known normal 11/21/20   2000   Time of discovery  (or onset of symptoms)  11/22/20   2100   Head CT read by me 11/22/20   2251   Was stroke code de-escalated? Yes 11/22/20 2347  patient is outside emergent treatment time parameters        Telestroke Service Details  Type of service telemedicine diagnostic assessment of acute neurological changes   Reason telemedicine is appropriate patient requires assessment with a specialist for diagnosis and treatment of neurological symptoms   Mode of transmission secure interactive audio and video communication per [employer]   Originating site (patient location) [facility]    Distant site (provider location) Provider remote site

## 2020-11-23 NOTE — PLAN OF CARE
Nursing shift note  Pt here with R CVA. A&Ox4. Neuros L facial droop, left sided weakness LUE 3/5, LLE 4/5, LUE ataxia, L drift, L neglect, slight WFD at times. VSS. Low grade fever, chills at times. Tele A-fib RVR. On diltiazem drip 10ml/hr. NPO for speech eval. Up with A1/GB/pivot. Voided in commode. Denies pain. Fluids infusing. Pt scoring green on the Aggression Stop Light Tool. Continue to monitor.     Pt's belongings:  Belongings patient arrived with include cell phone, clothing, and slippers      Home medications: No

## 2020-11-23 NOTE — ED TRIAGE NOTES
Patient's daughter called police for a welfare check. Patient lives alone. Found on floor by police. Patient's front of shirt covered in dry blood, patient states had a bloody nose.   Patient is a known diabetic, BG en route 118.

## 2020-11-23 NOTE — H&P
[facility]    History and Physical  Hospitalist       Date of Admission:  11/22/2020    Assessment & Plan     This is a 75-year-old female with history of diabetes mellitus type 2, hyperlipidemia, lives by herself, was brought into the ER, and found on the floor with left-sided weakness.      ASSESSMENT AND PLAN:   1.  Acute ischemic stroke:  This is a 75-year-old female with history of diabetes, hyperlipidemia, new onset atrial fibrillation who presented with acute ischemic stroke, most likely secondary to embolism because of her atrial fibrillation.  The CT scan shows evidence of small to acute infarct involving the posterior right insular cortex extending into the lateral right basal ganglia and right corona radiata.  No associated hemorrhage or significant mass effect.  CT of the head shows acute  occlusion involving the right M2, M3 inferior division vessel.  CTA of the neck is negative for any hemodynamically significant stenosis in the proximal internal carotid artery.  At this time, admit her, start her on aspirin per rectally.  Permissive hypertension.  Lipitor.  Echocardiogram.  PT, OT and speech evaluation.  Keep her n.p.o. at this time.  We will check her A1c, lipid panel.  We will see how she does.   2.  Atrial fibrillation with rapid ventricular response.  The patient started on Cardizem drip.  We need to be careful not to drop her blood pressure too much.  If she did drop her blood pressure, we need to change her Cardizem to either digoxin or amiodarone.  No anticoagulation at this time giving acute ischemic stroke, but most likely will need anticoagulation once she is cleared by Neurology.   3.  Hyperlipidemia.  Keep her on Lipitor at this time.   4.  Diabetes mellitus type 2.  She takes metformin at home.  We will hold the metformin.  Keep her on sliding scale insulin for correction hypoglycemia protocol.   5.  Deep venous thrombosis prophylaxis with SCDs.   6.  CODE  STATUS:  DNR as per the patient wishes.   7.  The case discussed with the ER physician and the nursing staff taking care of the patient.         AMBROSE VELÁZQUEZ MD              DVT Prophylaxis: Pneumatic Compression Devices  Code Status: DNR / DNI    Disposition: Expected discharge in 2 days once stable     Ambrose Velázquez MD    Primary Care Physician   No primary care provider on file.    Chief Complaint   Fall and left sided weakness     History is obtained from the patient    History of Present Illness   Admitted:     11/22/2020      PRIMARY CARE PHYSICIAN:  None listed.       HISTORY OF PRESENT ILLNESS:  This is a 75-year-old female with history of diabetes mellitus type 2, hyperlipidemia, lives by herself, was brought into the ER, and found on the floor with left-sided weakness.      The patient is not a very good historian.  History is obtained from the patient's medical records, and talking to the ER physician.  Actually, the patient lives by herself and her daughter lives in [place], but she usually talks to her daily.  Her daughter last spoke to her at 8:00 p.m. yesterday evening and was felt to be normal at that time  but unable to contact her today, so she checked up on her and was found to be on the floor with some left-sided weakness.  The patient is not much memory of recollection of how she fell down or had any symptoms.  Right now, she is feeling much better.  Denies any headache, dizziness, lightheadedness.  No chest pain, shortness of breath, fever, chills, nausea, vomiting, headache, dizziness, lightheadedness.      The patient was found to have left-sided weakness.  A stroke code was called and she was in atrial fibrillation with rapid ventricular response which is new onset.  CT scan does show no acute ischemic stroke and was evaluated by the Stroke Neurology does not recommend any TPA or any vascular intervention at this time.  The Hospitalist Service is consulted to admit the patient.     Past  Medical History    I have reviewed this patient's medical history and updated it with pertinent information if needed.   History reviewed. No pertinent past medical history.    Past Surgical History   I have reviewed this patient's surgical history and updated it with pertinent information if needed.  History reviewed. No pertinent surgical history.    Prior to Admission Medications   Prior to Admission Medications   Prescriptions Last Dose Informant Patient Reported? Taking?   metFORMIN (GLUCOPHAGE) 1000 MG tablet   Yes Yes   Sig: Take 1,000 mg by mouth 2 times daily (with meals)   simvastatin (ZOCOR) 40 MG tablet   Yes Yes   Sig: Take 40 mg by mouth At Bedtime      Facility-Administered Medications: None     Allergies   No Known Allergies    Social History   I have reviewed this patient's social history and updated it with pertinent information if needed. Abbey ALEXANDR Melgoza      Family History   I have reviewed this patient's family history and updated it with pertinent information if needed.   History reviewed. No pertinent family history.    Review of Systems   CONSTITUTIONAL:  negative  EYES:  negative  HEENT:  negative  RESPIRATORY:  negative  CARDIOVASCULAR:  negative  GASTROINTESTINAL:  negative  GENITOURINARY:  negative  INTEGUMENT/BREAST:  negative  HEMATOLOGIC/LYMPHATIC:  negative  ALLERGIC/IMMUNOLOGIC:  negative  ENDOCRINE:  negative  MUSCULOSKELETAL:  negative  NEUROLOGICAL:  weakness  BEHAVIOR/PSYCH:  negative    Physical Exam   Temp: 99.1  F (37.3  C)(Bear hugger removed.) Temp src: Oral BP: (!) 121/90 Pulse: 128   Resp: 16 SpO2: 94 %      Vital Signs with Ranges  Temp:  [98.7  F (37.1  C)-99.1  F (37.3  C)] 99.1  F (37.3  C)  Pulse:  [] 128  Resp:  [11-17] 16  BP: (112-137)/() 121/90  SpO2:  [94 %-100 %] 94 %  0 lbs 0 oz    Constitutional: Awake, alert, cooperative, no apparent distress.  Eyes: Conjunctiva and pupils examined and normal.  HEENT: Moist mucous membranes, normal  dentition.  Respiratory: Clear to auscultation bilaterally, no crackles or wheezing.  Cardiovascular: Irregularly irregular rhythm, normal S1 and S2, and no murmur noted.  GI: Soft, non-distended, non-tender, normal bowel sounds.  Lymph/Hematologic: No anterior cervical or supraclavicular adenopathy.  Skin: No rashes, no cyanosis, no edema.  Musculoskeletal: No joint swelling, erythema or tenderness.  Neurologic: left facial droop, left upper extremity 4/5, Left lower extremity 4+/5, right side 5/5. .  Psychiatric: Alert, oriented to person, place and time, no obvious anxiety or depression.    Data   Data reviewed today:  I personally reviewed EKG on monitor shows Afib with RVR   Recent Labs   Lab 11/22/20  2226   WBC 9.6   HGB 13.8   MCV 93      INR 1.14      POTASSIUM 4.1   CHLORIDE 107   CO2 22   BUN 18   CR 0.84   ANIONGAP 10   KAROLYN 9.2   *   ALBUMIN 4.1   PROTTOTAL 7.2   BILITOTAL 2.0*   ALKPHOS 47   ALT 24   AST 32   TROPI <0.015       Recent Results (from the past 24 hour(s))   CTA Head Neck with Contrast    Narrative    EXAM: CTA  HEAD NECK WITH CONTRAST, CT HEAD W/O CONTRAST, CT HEAD PERFUSION WITH CONTRAST  LOCATION: [facility]  DATE/TIME: 11/22/2020 10:35 PM    INDICATION: Patient found down presenting with left-sided weakness. Unknown time of onset of symptoms.  COMPARISON: None.  TECHNIQUE: Head and neck CT angiogram with IV contrast. Noncontrast head CT followed by axial helical CT images of the head and neck vessels obtained during the arterial phase of intravenous contrast administration. Axial 2D reconstructed images and   multiplanar 3D MIP reconstructed images of the head and neck vessels were performed by the technologist. Additional CT cerebral perfusion was performed utilizing a second contrast bolus. Perfusion data were post processed with generation of standard   perfusion maps and estimation of ischemic/infarcted volumes utilizing standard threshold values.  Dose reduction techniques were used.  All stenosis measurements made according to NASCET criteria unless otherwise specified.  CONTRAST: 70 mL Isovue-370 (accession [id number]), 70 mL Isovue-370 (accession [id number]), 50mL Isovue-370 (accession [id number]).  COMPARISON: None.    FINDINGS:   NONCONTRAST HEAD CT:   INTRACRANIAL CONTENTS: No intracranial hemorrhage, extraaxial collection, or mass effect. There is a 2.6 x 1.0 x 2.2 cm zone of low-attenuation change with loss of gray-white differentiation involving the posterior right insular cortex extending   superiorly into the corona radiata and involving the lateral right basal ganglia. No significant mass effect at this time. Adjacent to this, there is a small hyperdense vessel in the right sylvian fissure. Mild presumed chronic small vessel ischemic   changes. Mild to moderate generalized volume loss. No hydrocephalus.     VISUALIZED ORBITS/SINUSES/MASTOIDS: No intraorbital abnormality. No paranasal sinus mucosal disease. No middle ear or mastoid effusion.    BONES/SOFT TISSUES: No acute abnormality.    HEAD CTA:  ANTERIOR CIRCULATION: There is evidence of occlusion of a right M2/M3 branch vessel in the right sylvian fissure adjacent to the area of recent infarct and hyperdense vessel sign. Initially, approximately 8 mm beyond the origin of the vessel, the vessel   markedly narrows and then there is approximately 1 cm segment of thread-like enhancement which eventually occludes. Remainder of the anterior circulation demonstrates no flow-limiting stenosis or major vessel occlusion. Standard [place] anatomy.    POSTERIOR CIRCULATION: No stenosis/occlusion, aneurysm, or high flow vascular malformation. Dominant right and smaller left vertebral artery contribute to a normal basilar artery.     DURAL VENOUS SINUSES: Expected enhancement of the major dural venous sinuses.    NECK CTA:  RIGHT CAROTID: Mild atheromatous changes. No measurable stenosis or  dissection.    LEFT CAROTID: Atherosclerotic plaque results in less than 50% stenosis in the left ICA. No dissection.    VERTEBRAL ARTERIES: No focal stenosis or dissection. Dominant right and smaller left vertebral arteries.    AORTIC ARCH: Classic aortic arch anatomy with no significant stenosis at the origin of the great vessels.    NONVASCULAR STRUCTURES: Unremarkable.    CT PERFUSION:  PERFUSION MAPS: In the region of the posterior right insula and lateral right basal ganglia, corresponding to the abnormality on CT, there is an area demonstrating elevated mean transit time, decreased cerebral blood volume and  decreased cerebral blood   flow. Lateral to this within the mid lateral right temporal lobe, there is a zone demonstrating decreased cerebral blood flow, elevated mean transit time and relatively normal cerebral blood volume.    RAPID ANALYSIS: CBF<30%: 5 mL  Tmax>6sec: 18 mL  Mismatch volume: 13 mL  Mismatch ratio: 3.6      Impression    IMPRESSION:   HEAD CT:  1.  Evidence of small to moderate acute infarct involving the posterior right insular cortex extending into the lateral right basal ganglia and right corona radiata. No associated hemorrhage or significant mass effect.  2.  Underlying mild presumed chronic small vessel ischemic changes and mild to moderate volume loss.    HEAD CTA:   1.  Evidence of acute thromboembolism and vessel occlusion involving a right M2/M3 inferior division vessel.    NECK CTA:  1.  No hemodynamically significant stenosis in either proximal internal carotid artery based on NASCET criteria.  2.  The vertebral arteries are patent through the neck and into the head.    CT PERFUSION:  1.  Rapid analysis demonstrates a core infarct volume of 5 mL and a mismatch volume of 13 mL. The mismatch ratio is 3.6.    Results were called to Dr. Rizo at 10:57 PM on 11/22/2020.   CT Head w/o Contrast    Narrative    EXAM: CTA  HEAD NECK WITH CONTRAST, CT HEAD W/O CONTRAST, CT HEAD  PERFUSION WITH CONTRAST  LOCATION: [facility]  DATE/TIME: 11/22/2020 10:35 PM    INDICATION: Patient found down presenting with left-sided weakness. Unknown time of onset of symptoms.  COMPARISON: None.  TECHNIQUE: Head and neck CT angiogram with IV contrast. Noncontrast head CT followed by axial helical CT images of the head and neck vessels obtained during the arterial phase of intravenous contrast administration. Axial 2D reconstructed images and   multiplanar 3D MIP reconstructed images of the head and neck vessels were performed by the technologist. Additional CT cerebral perfusion was performed utilizing a second contrast bolus. Perfusion data were post processed with generation of standard   perfusion maps and estimation of ischemic/infarcted volumes utilizing standard threshold values. Dose reduction techniques were used.  All stenosis measurements made according to NASCET criteria unless otherwise specified.  CONTRAST: 70 mL Isovue-370 (accession [id number]), 70 mL Isovue-370 (accession [id number]), 50mL Isovue-370 (accession [id number]).  COMPARISON: None.    FINDINGS:   NONCONTRAST HEAD CT:   INTRACRANIAL CONTENTS: No intracranial hemorrhage, extraaxial collection, or mass effect. There is a 2.6 x 1.0 x 2.2 cm zone of low-attenuation change with loss of gray-white differentiation involving the posterior right insular cortex extending   superiorly into the corona radiata and involving the lateral right basal ganglia. No significant mass effect at this time. Adjacent to this, there is a small hyperdense vessel in the right sylvian fissure. Mild presumed chronic small vessel ischemic   changes. Mild to moderate generalized volume loss. No hydrocephalus.     VISUALIZED ORBITS/SINUSES/MASTOIDS: No intraorbital abnormality. No paranasal sinus mucosal disease. No middle ear or mastoid effusion.    BONES/SOFT TISSUES: No acute abnormality.    HEAD CTA:  ANTERIOR CIRCULATION: There is evidence of  occlusion of a right M2/M3 branch vessel in the right sylvian fissure adjacent to the area of recent infarct and hyperdense vessel sign. Initially, approximately 8 mm beyond the origin of the vessel, the vessel   markedly narrows and then there is approximately 1 cm segment of thread-like enhancement which eventually occludes. Remainder of the anterior circulation demonstrates no flow-limiting stenosis or major vessel occlusion. Standard [place] anatomy.    POSTERIOR CIRCULATION: No stenosis/occlusion, aneurysm, or high flow vascular malformation. Dominant right and smaller left vertebral artery contribute to a normal basilar artery.     DURAL VENOUS SINUSES: Expected enhancement of the major dural venous sinuses.    NECK CTA:  RIGHT CAROTID: Mild atheromatous changes. No measurable stenosis or dissection.    LEFT CAROTID: Atherosclerotic plaque results in less than 50% stenosis in the left ICA. No dissection.    VERTEBRAL ARTERIES: No focal stenosis or dissection. Dominant right and smaller left vertebral arteries.    AORTIC ARCH: Classic aortic arch anatomy with no significant stenosis at the origin of the great vessels.    NONVASCULAR STRUCTURES: Unremarkable.    CT PERFUSION:  PERFUSION MAPS: In the region of the posterior right insula and lateral right basal ganglia, corresponding to the abnormality on CT, there is an area demonstrating elevated mean transit time, decreased cerebral blood volume and  decreased cerebral blood   flow. Lateral to this within the mid lateral right temporal lobe, there is a zone demonstrating decreased cerebral blood flow, elevated mean transit time and relatively normal cerebral blood volume.    RAPID ANALYSIS: CBF<30%: 5 mL  Tmax>6sec: 18 mL  Mismatch volume: 13 mL  Mismatch ratio: 3.6      Impression    IMPRESSION:   HEAD CT:  1.  Evidence of small to moderate acute infarct involving the posterior right insular cortex extending into the lateral right basal ganglia and  right corona radiata. No associated hemorrhage or significant mass effect.  2.  Underlying mild presumed chronic small vessel ischemic changes and mild to moderate volume loss.    HEAD CTA:   1.  Evidence of acute thromboembolism and vessel occlusion involving a right M2/M3 inferior division vessel.    NECK CTA:  1.  No hemodynamically significant stenosis in either proximal internal carotid artery based on NASCET criteria.  2.  The vertebral arteries are patent through the neck and into the head.    CT PERFUSION:  1.  Rapid analysis demonstrates a core infarct volume of 5 mL and a mismatch volume of 13 mL. The mismatch ratio is 3.6.    Results were called to Dr. Rizo at 10:57 PM on 11/22/2020.   CT Head Perfusion w Contrast    Narrative    EXAM: CTA  HEAD NECK WITH CONTRAST, CT HEAD W/O CONTRAST, CT HEAD PERFUSION WITH CONTRAST  LOCATION: [facility]  DATE/TIME: 11/22/2020 10:35 PM    INDICATION: Patient found down presenting with left-sided weakness. Unknown time of onset of symptoms.  COMPARISON: None.  TECHNIQUE: Head and neck CT angiogram with IV contrast. Noncontrast head CT followed by axial helical CT images of the head and neck vessels obtained during the arterial phase of intravenous contrast administration. Axial 2D reconstructed images and   multiplanar 3D MIP reconstructed images of the head and neck vessels were performed by the technologist. Additional CT cerebral perfusion was performed utilizing a second contrast bolus. Perfusion data were post processed with generation of standard   perfusion maps and estimation of ischemic/infarcted volumes utilizing standard threshold values. Dose reduction techniques were used.  All stenosis measurements made according to NASCET criteria unless otherwise specified.  CONTRAST: 70 mL Isovue-370 (accession [id number]), 70 mL Isovue-370 (accession [id number]), 50mL Isovue-370 (accession [id number]).  COMPARISON: None.    FINDINGS:   NONCONTRAST HEAD  CT:   INTRACRANIAL CONTENTS: No intracranial hemorrhage, extraaxial collection, or mass effect. There is a 2.6 x 1.0 x 2.2 cm zone of low-attenuation change with loss of gray-white differentiation involving the posterior right insular cortex extending   superiorly into the corona radiata and involving the lateral right basal ganglia. No significant mass effect at this time. Adjacent to this, there is a small hyperdense vessel in the right sylvian fissure. Mild presumed chronic small vessel ischemic   changes. Mild to moderate generalized volume loss. No hydrocephalus.     VISUALIZED ORBITS/SINUSES/MASTOIDS: No intraorbital abnormality. No paranasal sinus mucosal disease. No middle ear or mastoid effusion.    BONES/SOFT TISSUES: No acute abnormality.    HEAD CTA:  ANTERIOR CIRCULATION: There is evidence of occlusion of a right M2/M3 branch vessel in the right sylvian fissure adjacent to the area of recent infarct and hyperdense vessel sign. Initially, approximately 8 mm beyond the origin of the vessel, the vessel   markedly narrows and then there is approximately 1 cm segment of thread-like enhancement which eventually occludes. Remainder of the anterior circulation demonstrates no flow-limiting stenosis or major vessel occlusion. Standard [place] anatomy.    POSTERIOR CIRCULATION: No stenosis/occlusion, aneurysm, or high flow vascular malformation. Dominant right and smaller left vertebral artery contribute to a normal basilar artery.     DURAL VENOUS SINUSES: Expected enhancement of the major dural venous sinuses.    NECK CTA:  RIGHT CAROTID: Mild atheromatous changes. No measurable stenosis or dissection.    LEFT CAROTID: Atherosclerotic plaque results in less than 50% stenosis in the left ICA. No dissection.    VERTEBRAL ARTERIES: No focal stenosis or dissection. Dominant right and smaller left vertebral arteries.    AORTIC ARCH: Classic aortic arch anatomy with no significant stenosis at the origin of  the great vessels.    NONVASCULAR STRUCTURES: Unremarkable.    CT PERFUSION:  PERFUSION MAPS: In the region of the posterior right insula and lateral right basal ganglia, corresponding to the abnormality on CT, there is an area demonstrating elevated mean transit time, decreased cerebral blood volume and  decreased cerebral blood   flow. Lateral to this within the mid lateral right temporal lobe, there is a zone demonstrating decreased cerebral blood flow, elevated mean transit time and relatively normal cerebral blood volume.    RAPID ANALYSIS: CBF<30%: 5 mL  Tmax>6sec: 18 mL  Mismatch volume: 13 mL  Mismatch ratio: 3.6      Impression    IMPRESSION:   HEAD CT:  1.  Evidence of small to moderate acute infarct involving the posterior right insular cortex extending into the lateral right basal ganglia and right corona radiata. No associated hemorrhage or significant mass effect.  2.  Underlying mild presumed chronic small vessel ischemic changes and mild to moderate volume loss.    HEAD CTA:   1.  Evidence of acute thromboembolism and vessel occlusion involving a right M2/M3 inferior division vessel.    NECK CTA:  1.  No hemodynamically significant stenosis in either proximal internal carotid artery based on NASCET criteria.  2.  The vertebral arteries are patent through the neck and into the head.    CT PERFUSION:  1.  Rapid analysis demonstrates a core infarct volume of 5 mL and a mismatch volume of 13 mL. The mismatch ratio is 3.6.    Results were called to Dr. Rizo at 10:57 PM on 11/22/2020.

## 2020-11-23 NOTE — ED PROVIDER NOTES
History     Chief Complaint:  Fall      The history is limited by the condition of the patient.      Abbey Melgoza is a 75 year old female with a history of type II diabetes mellitus who presents via EMS after a fall. Per the patient and EMS personnel, the patient has had weakness and had a fall at an unknown time with unknown circumstances. Her daughter talked to her yesterday but couldn't get a hold of her so she called police for a welfare check. Police found her on the floor with the front of her shirt covered in dry blood, and the patient states she had a bloody nose. Her blood sugar was 118 en route.    Allergies:  No known drug allergies.    Medications:    Simvastatin  Glucophage    Past Medical History:    Type II diabetes mellitus  Hyperlipidemia    Past Surgical History:        Family History:    Father: Alcohol/drug, type II diabetes, dementia, arrhythmia, pacemaker, CHF, polio  Mother: Breast cancer, cardiomyopathy, hypertension  Sister: Rheumatoid arthritis    Social History:  The patient was accompanied to the ED by EMS.  Smoking Status: Never Smoker  Smokeless Tobacco: Never Used  Alcohol Use: Negative  Marital Status:      Review of Systems   Unable to perform ROS: Mental status change         Physical Exam     Patient Vitals for the past 24 hrs:   BP Temp Temp src Pulse Resp SpO2   20 0018 -- 99.1  F (37.3  C) Oral -- -- --   20 0000 123/65 -- -- 140 15 --   20 2315 -- -- -- 156 16 94 %   20 2300 121/70 -- -- 147 15 94 %   20 2230 -- -- -- 152 16 98 %   20 2215 (!) 137/108 98.7  F (37.1  C) Oral 80 16 100 %       Physical Exam  General: Appears well-developed and well-nourished.   Head: Superficial laceration to posterior scalp  Mouth/Throat: Oropharynx is clear and moist.   Eyes: Conjunctivae are normal. Pupils are equal, round, and reactive to light.   Neck: Normal range of motion. No nuchal rigidity. No cervical adenopathy  CV:  Tachycardic  Resp: Effort normal and breath sounds normal. No respiratory distress.   GI: Soft. There is no tenderness.  No rebound or guarding.  Normal bowel sounds.    MSK: Normal range of motion. no edema. No Calf tenderness.  Neuro: The patient is alert, but poor historian and difficult to obtain history from. EOMI.  Able to lift arms and legs, but left side seems slightly weaker than right.  Decreased sensation to left side.  Mild left sided facial droop  Skin: Skin is warm and dry. No rash noted.       Emergency Department Course     ECG:  A-fib with RVR    Imaging:  Radiology findings were communicated with the patient who voiced understanding of the findings.    CT Head Perfusion w Contrast  HEAD CT:  1.  Evidence of small to moderate acute infarct involving the posterior right insular cortex extending into the lateral right basal ganglia and right corona radiata. No associated hemorrhage or significant mass effect.  2.  Underlying mild presumed chronic small vessel ischemic changes and mild to moderate volume loss.  HEAD CTA:   1.  Evidence of acute thromboembolism and vessel occlusion involving a right M2/M3 inferior division vessel.  NECK CTA:  1.  No hemodynamically significant stenosis in either proximal internal carotid artery based on NASCET criteria.  2.  The vertebral arteries are patent through the neck and into the head.  CT PERFUSION:  1.  Rapid analysis demonstrates a core infarct volume of 5 mL and a mismatch volume of 13 mL. The mismatch ratio is 3.6.  Reading per radiology.    CT Head w/o Contrast  HEAD CT:  1.  Evidence of small to moderate acute infarct involving the posterior right insular cortex extending into the lateral right basal ganglia and right corona radiata. No associated hemorrhage or significant mass effect.  2.  Underlying mild presumed chronic small vessel ischemic changes and mild to moderate volume loss.  HEAD CTA:   1.  Evidence of acute thromboembolism and vessel  occlusion involving a right M2/M3 inferior division vessel.  NECK CTA:  1.  No hemodynamically significant stenosis in either proximal internal carotid artery based on NASCET criteria.  2.  The vertebral arteries are patent through the neck and into the head.  CT PERFUSION:  1.  Rapid analysis demonstrates a core infarct volume of 5 mL and a mismatch volume of 13 mL. The mismatch ratio is 3.6.  Reading per radiology.    CTA Head Neck with Contrast  HEAD CT:  1.  Evidence of small to moderate acute infarct involving the posterior right insular cortex extending into the lateral right basal ganglia and right corona radiata. No associated hemorrhage or significant mass effect.  2.  Underlying mild presumed chronic small vessel ischemic changes and mild to moderate volume loss.  HEAD CTA:   1.  Evidence of acute thromboembolism and vessel occlusion involving a right M2/M3 inferior division vessel.  NECK CTA:  1.  No hemodynamically significant stenosis in either proximal internal carotid artery based on NASCET criteria.  2.  The vertebral arteries are patent through the neck and into the head.  CT PERFUSION:  1.  Rapid analysis demonstrates a core infarct volume of 5 mL and a mismatch volume of 13 mL. The mismatch ratio is 3.6.  Reading per radiology.    Laboratory:  Laboratory findings were communicated with the patient who voiced understanding of the findings.    CBC: WBC 9.6, HGB 13.8,   CMP: Glucose 147(H), Bilirubin Total 2.0(H) o/w WNL (Creatinine 0.84)  INR: 1.14  CK Total: 659(H)  Troponin (Collected 2226): <0.015    Asymptomatic COVID-19 by PCR Swab: In [facility]    -Laceration Repair    Date/Time: 11/23/2020 3:17 AM  Performed by: Eliseo Rizo MD  Authorized by: Eliseo Rizo MD     LACERATION DETAILS     Location:  Scalp    Length (cm):  2    REPAIR TYPE:     Repair type:  Simple      EXPLORATION:     Contaminated: no      TREATMENT:     Area cleansed  with:  Partha    SKIN REPAIR     Repair method:  Tissue adhesive  PROCEDURE   Patient Tolerance:  Patient tolerated the procedure well with no immediate complications            Interventions:  2343: Normal Saline, 1 liter, IV bolus   2355: Cardizem, 10 mg, IV  0006: Aspirin, 300 mg, Rectal  0007: Cardizem, 5 mg/hr, IV  0028: Cardizem, 10 mg, IV    Emergency Department Course:    ED Course as of Nov 23 0313   Sun Nov 22, 2020 2224 Nursing notes and vitals reviewed.      2225 I performed a physical exam of the patient as documented above.      2226 IV was inserted and blood was drawn for laboratory testing, results above.       2230 I called code stroke.      2231 EKG obtained in the ED, see results above.        2235 The patient was sent for a CT while in the emergency department, results above.        2257 I spoke with Dr. Nichols of the radiology service from [facility] regarding patient's presentation, findings, and plan of care.       2345 Patient rechecked and updated.        Mon Nov 23, 2020   0016 Patient swabbed for COVID-19 testing.      0023 Patient rechecked and updated.        0024 I spoke with Dr. Velázquez of the hospitalist service from [facility] regarding patient's presentation, findings, and plan of care.       0028 Patient rechecked and updated.          Findings and plan explained to the patient who consents to admission. Discussed the patient with Dr. Velázquez, who will admit the patient to a neuro bed for further monitoring, evaluation, and treatment.    Impression & Plan      CMS Diagnoses: The patient has stroke symptoms:         ED Stroke specific documentation           NIHSS PDF     Patient last known well time: unknown  ED Provider first to bedside at: 2225  CT Results received at: 2257    tPA:   Not given due to established infarct on imaging.    If treating with tPA: Ensure SBP<185 and DBP<105 prior to treatment with IV tPA.  Administering IV tPA after treatment with IV  labetalol, hydralazine, or nicardipine is reasonable once BP control is established.    Endovascular Retrieval:  Not initiated due to absence of proximal vessel occlusion    National Institutes of Health Stroke Scale (Baseline)  Time Performed: arrival     Score    Level of consciousness: (0)   Alert, keenly responsive    LOC questions: (0)   Answers both questions correctly    LOC commands: (0)   Performs both tasks correctly    Best gaze: (0)   Normal    Visual: (0)   No visual loss    Facial palsy: (2)   Partial paralysis (total/near total of lower face)    Motor arm (left): (1)   Drift    Motor arm (right): (0)   No drift    Motor leg (left): (1)   Drift    Motor leg (right): (0)   No drift    Limb ataxia: (0)   Absent    Sensory: (1)   Mild to moderate sensory loss    Best language: (0)   Normal- no aphasia    Dysarthria: (0)   Normal    Extinction and inattention: (0)   No abnormality        Total Score:  5        Stroke Mimics were considered (including migraine headache, seizure disorder, hypoglycemia (or hyperglycemia), head or spinal trauma, CNS infection, Toxin ingestion and shock state (e.g. sepsis) .        Medical Decision Making:  Abbey Melgoza is a 75 year old female who presents to the emergency department today for evaluation of weakness and altered mental status.  Patient's daughter apparently lives in [place] and speaks to the patient daily.  When she did not hear her from the pt today, the daughter called for a welfare check and the patient was found on the floor.  On evaluation it seemed that she did have some slight left-sided weakness and decreased sensation.  History was limited from the patient.  She was able to provide some basic history but cannot relay the events of what had happened.  Patient is also noted to be in A. fib with RVR.  Code stroke was called and CT scans did show findings consistent with a stroke and apparent infarct.  Given the history and CT scan findings, it was  not felt that the patient was within the window for TPA and the area of clot was not amenable to mechanical intervention.  Patient was admitted for further neurologic monitoring.  Patient was started on diltiazem drip with improvement of her heart rate while she was in the ER.  She was given rectal aspirin.    Critical Care time was 60 minutes for this patient excluding procedures.     Covid-19  Abbey Melgoza was evaluated during a global COVID-19 pandemic, which necessitated consideration that the patient might be at risk for infection with the SARS-CoV-2 virus that causes COVID-19.   Applicable protocols for evaluation were followed during the patient's care.   COVID-19 was considered as part of the patient's evaluation. The plan for testing is:  a test was obtained during this visit.      Diagnosis:    ICD-10-CM    1. Cerebrovascular accident (CVA), unspecified mechanism (H)  I63.9    2. Atrial fibrillation with RVR (H)  I48.91      Disposition:   Patient was admitted to a neuro bed.    Scribe Disclosure:  ITomer, am serving as a scribe at 10:25 PM on 11/22/2020 to document services personally performed by Eliseo Rizo MD based on my observations and the provider's statements to me.    [facility] EMERGENCY DEPT       Eliseo Rizo MD  11/23/20 0318

## 2020-11-23 NOTE — PROVIDER NOTIFICATION
"Paged Roseanne, \"Dr. Carlson wondering if you still want permissive HTN? Currently still on dilt gtt. wondering if he can start her on PO heart rate controlled medications. \"    Per Dr. Carlson if neuro wants permissive HTN discontinue PO metoprolol    ADDENDUM: Per Dr. James ok to start metoprolol. Will leave order in place per Dr. Carlson. Plan to wean dilt gtt after metoprolol given  "

## 2020-11-23 NOTE — PLAN OF CARE
Pt here with R CVA. A&Ox3-4. Neuros intact ex L facial droop, LUE/LE hemiparesis, L slow & deliberate, L neglect, decreased sensation on L (painful stimuli feels dull), possible L field cut?--inconsistent with following commands. VSS on RA & 10ml dilt gtt. Tele a.fib CVR with occasional RVR. DD2 diet, NTL liquids. Takes pills whole. Up with Ax1GB & walker. Denies pain. Pt scoring green on the Aggression Stop Light Tool. Plan MRI & ECHO. COVID negative Discharge ARU once medically cleared. Pt stated ok to give daughter security code. Talked & updated daughter for 30minutes today.

## 2020-11-23 NOTE — H&P
Admitted:     11/22/2020      PRIMARY CARE PHYSICIAN:  None listed.       HISTORY OF PRESENT ILLNESS:  This is a 75-year-old female with history of diabetes mellitus type 2, hyperlipidemia, lives by herself, was brought into the ER, and found on the floor with left-sided weakness.      The patient is not a very good historian.  History is obtained from the patient's medical records, and talking to the ER physician.  Actually, the patient lives by herself and her daughter lives in [place], but she usually talks to her daily.  Her daughter last spoke to her at 8:00 p.m. yesterday evening and was felt to be normal at that time  but unable to contact her today, so she checked up on her and was found to be on the floor with some left-sided weakness.  The patient is not much memory of recollection of how she fell down or had any symptoms.  Right now, she is feeling much better.  Denies any headache, dizziness, lightheadedness.  No chest pain, shortness of breath, fever, chills, nausea, vomiting, headache, dizziness, lightheadedness.      The patient was found to have left-sided weakness.  A stroke code was called and she was in atrial fibrillation with rapid ventricular response which is new onset.  CT scan does show no acute ischemic stroke and was evaluated by the Stroke Neurology does not recommend any TPA or any vascular intervention at this time.  The Hospitalist Service is consulted to admit the patient.      ASSESSMENT AND PLAN:   1.  Acute ischemic stroke:  This is a 75-year-old female with history of diabetes, hyperlipidemia, new onset atrial fibrillation who presented with acute ischemic stroke, most likely secondary to embolism because of her atrial fibrillation.  The CT scan shows evidence of small to acute infarct involving the posterior right insular cortex extending into the lateral right basal ganglia and right corona radiata.  No associated hemorrhage or significant mass effect.  CT of the head shows  acute  occlusion involving the right M2, M3 inferior division vessel.  CTA of the neck is negative for any hemodynamically significant stenosis in the proximal internal carotid artery.  At this time, admit her, start her on aspirin per rectally.  Permissive hypertension.  Lipitor.  Echocardiogram.  PT, OT and speech evaluation.  Keep her n.p.o. at this time.  We will check her A1c, lipid panel.  We will see how she does.   2.  Atrial fibrillation with rapid ventricular response.  The patient started on Cardizem drip.  We need to be careful not to drop her blood pressure too much.  If she did drop her blood pressure, we need to change her Cardizem to either digoxin or amiodarone.  No anticoagulation at this time giving acute ischemic stroke, but most likely will need anticoagulation once she is cleared by Neurology.   3.  Hyperlipidemia.  Keep her on Lipitor at this time.   4.  Diabetes mellitus type 2.  She takes metformin at home.  We will hold the metformin.  Keep her on sliding scale insulin for correction hypoglycemia protocol.   5.  Deep venous thrombosis prophylaxis with SCDs.   6.  CODE STATUS:  DNR as per the patient wishes.   7.  The case discussed with the ER physician and the nursing staff taking care of the patient.         PATRICIA WHITEHEAD MD             D: 2020   T: 2020   MT: DELTA      Name:     BRANDEN ARMIJO   MRN:      [id number]        Account:      [id number]   :      1945        Admitted:     2020                   Document: [id number]

## 2020-11-24 ENCOUNTER — APPOINTMENT (OUTPATIENT)
Dept: CARDIOLOGY | Facility: CLINIC | Age: 75
DRG: 064 | End: 2020-11-24
Attending: INTERNAL MEDICINE
Payer: COMMERCIAL

## 2020-11-24 ENCOUNTER — APPOINTMENT (OUTPATIENT)
Dept: OCCUPATIONAL THERAPY | Facility: CLINIC | Age: 75
DRG: 064 | End: 2020-11-24
Payer: COMMERCIAL

## 2020-11-24 ENCOUNTER — APPOINTMENT (OUTPATIENT)
Dept: PHYSICAL THERAPY | Facility: CLINIC | Age: 75
DRG: 064 | End: 2020-11-24
Payer: COMMERCIAL

## 2020-11-24 ENCOUNTER — APPOINTMENT (OUTPATIENT)
Dept: SPEECH THERAPY | Facility: CLINIC | Age: 75
DRG: 064 | End: 2020-11-24
Payer: COMMERCIAL

## 2020-11-24 LAB
GLUCOSE BLDC GLUCOMTR-MCNC: 108 MG/DL (ref 70–99)
GLUCOSE BLDC GLUCOMTR-MCNC: 113 MG/DL (ref 70–99)
GLUCOSE BLDC GLUCOMTR-MCNC: 134 MG/DL (ref 70–99)
GLUCOSE BLDC GLUCOMTR-MCNC: 162 MG/DL (ref 70–99)
GLUCOSE BLDC GLUCOMTR-MCNC: 172 MG/DL (ref 70–99)

## 2020-11-24 PROCEDURE — 258N000003 HC RX IP 258 OP 636: Performed by: INTERNAL MEDICINE

## 2020-11-24 PROCEDURE — 93306 TTE W/DOPPLER COMPLETE: CPT | Mod: 26 | Performed by: INTERNAL MEDICINE

## 2020-11-24 PROCEDURE — 258N000003 HC RX IP 258 OP 636: Performed by: EMERGENCY MEDICINE

## 2020-11-24 PROCEDURE — 120N000001 HC R&B MED SURG/OB

## 2020-11-24 PROCEDURE — 92526 ORAL FUNCTION THERAPY: CPT | Mod: GN | Performed by: SPEECH-LANGUAGE PATHOLOGIST

## 2020-11-24 PROCEDURE — 250N000013 HC RX MED GY IP 250 OP 250 PS 637: Performed by: INTERNAL MEDICINE

## 2020-11-24 PROCEDURE — 999N000208 ECHOCARDIOGRAM COMPLETE

## 2020-11-24 PROCEDURE — 250N000009 HC RX 250: Performed by: INTERNAL MEDICINE

## 2020-11-24 PROCEDURE — 97112 NEUROMUSCULAR REEDUCATION: CPT | Mod: GP

## 2020-11-24 PROCEDURE — 97535 SELF CARE MNGMENT TRAINING: CPT | Mod: GO

## 2020-11-24 PROCEDURE — 99232 SBSQ HOSP IP/OBS MODERATE 35: CPT | Performed by: PSYCHIATRY & NEUROLOGY

## 2020-11-24 PROCEDURE — 999N001017 HC STATISTIC GLUCOSE BY METER IP

## 2020-11-24 PROCEDURE — 99232 SBSQ HOSP IP/OBS MODERATE 35: CPT | Performed by: INTERNAL MEDICINE

## 2020-11-24 PROCEDURE — 97116 GAIT TRAINING THERAPY: CPT | Mod: GP

## 2020-11-24 PROCEDURE — 255N000002 HC RX 255 OP 636: Performed by: INTERNAL MEDICINE

## 2020-11-24 PROCEDURE — 97530 THERAPEUTIC ACTIVITIES: CPT | Mod: GO

## 2020-11-24 RX ORDER — CHOLECALCIFEROL (VITAMIN D3) 50 MCG
1 TABLET ORAL 2 TIMES DAILY
COMMUNITY

## 2020-11-24 RX ORDER — NICOTINE POLACRILEX 4 MG
15-30 LOZENGE BUCCAL
Status: DISCONTINUED | OUTPATIENT
Start: 2020-11-24 | End: 2020-11-30 | Stop reason: HOSPADM

## 2020-11-24 RX ORDER — METOPROLOL TARTRATE 50 MG
50 TABLET ORAL 2 TIMES DAILY
Status: DISCONTINUED | OUTPATIENT
Start: 2020-11-24 | End: 2020-11-25

## 2020-11-24 RX ORDER — DEXTROSE MONOHYDRATE 25 G/50ML
25-50 INJECTION, SOLUTION INTRAVENOUS
Status: DISCONTINUED | OUTPATIENT
Start: 2020-11-24 | End: 2020-11-30 | Stop reason: HOSPADM

## 2020-11-24 RX ADMIN — SODIUM CHLORIDE: 9 INJECTION, SOLUTION INTRAVENOUS at 01:41

## 2020-11-24 RX ADMIN — SODIUM CHLORIDE: 9 INJECTION, SOLUTION INTRAVENOUS at 09:51

## 2020-11-24 RX ADMIN — DILTIAZEM HYDROCHLORIDE 5 MG/HR: 5 INJECTION INTRAVENOUS at 02:12

## 2020-11-24 RX ADMIN — METOPROLOL TARTRATE 12.5 MG: 25 TABLET, FILM COATED ORAL at 16:02

## 2020-11-24 RX ADMIN — METOPROLOL TARTRATE 50 MG: 50 TABLET, FILM COATED ORAL at 20:45

## 2020-11-24 RX ADMIN — HUMAN ALBUMIN MICROSPHERES AND PERFLUTREN 9 ML: 10; .22 INJECTION, SOLUTION INTRAVENOUS at 09:00

## 2020-11-24 RX ADMIN — ATORVASTATIN CALCIUM 40 MG: 40 TABLET, FILM COATED ORAL at 20:45

## 2020-11-24 RX ADMIN — METOPROLOL TARTRATE 25 MG: 25 TABLET, FILM COATED ORAL at 08:06

## 2020-11-24 RX ADMIN — ASPIRIN 325 MG: 325 TABLET, COATED ORAL at 08:06

## 2020-11-24 ASSESSMENT — ACTIVITIES OF DAILY LIVING (ADL)
ADLS_ACUITY_SCORE: 20

## 2020-11-24 NOTE — CONSULTS
Stroke Education Note    The following information has been reviewed with the patient and family:    1. Warning signs of stroke    2. Calling 911 if having warning signs of stroke    3. All modifiable risk factors: hypertension, CAD, atrial fib, diabetes, hypercholesterolemia, smoking, substance abuse, diet, physical inactivity, obesity, sleep apnea.    4. Patient's risk factors for stroke which include: HLD, DM2, and Afib    5. Follow-up plan for after discharge    6. Discharge medications which include: Aspirin, Lipitor, and Metoprolol    In addition, the [facility] Stroke Class Handout has been given to the patient and family.    Learner's response to risk factors / lifestyle modification education: Taking steps     HORACE Dumont RN

## 2020-11-24 NOTE — PHARMACY-RX INSURANCE COVERAGE
Anticoagulaton coverage check.  Patient has Medicare D through [facility] with $350 (of $350) deductible remaining.    Xarelto/Eliquis   November Upon reciept of RX, Discharge Pharmacy can dispense 1 month free.   December $385 (fulfills deductible)   (assuming patient stays with same plan in 2021)   Jan-Aug $37/mo   Sept-Dec $117/mo (coverage gap)     Jantoven (warfarin): $3/mo      -JOSÉ MIGUEL Valadez, Pharmacy Technician/Liaison, Discharge Pharmacy [phone]

## 2020-11-24 NOTE — PROGRESS NOTES
Lab stated that labs drawn yesterday still in process & there was no collections in lab. Reordered labs now & they will cancel the ones still showing in process.

## 2020-11-24 NOTE — PROGRESS NOTES
Care Management Follow Up    Length of Stay (days): 1    Expected Discharge Date: 11/25/20     Concerns to be Addressed:       Patient plan of care discussed at interdisciplinary rounds: Yes    Anticipated Discharge Disposition:  [facility]     Anticipated Discharge Services:    Anticipated Discharge DME:      Patient/family educated on Medicare website which has current facility and service quality ratings:  N/A  Education Provided on the Discharge Plan:  Yes  Patient/Family in Agreement with the Plan:  Yes    Referrals Placed by CM/SW:  [facility]  Private pay costs discussed: Possible transport costs, if applicable    Additional Information:    [facility] SW resent it thru D[facility]. SW attempted to update pt's daughter, Sunni per her request (#[phone]) with [facility] from [facility] , however, call was not able to be completed.    GABRIEL Lomas   [facility]  [phone]    UPDATE@1615: [facility] and will not be admitting on Thursday, thus they may look at taking pt Friday, 11/27.

## 2020-11-24 NOTE — TELEPHONE ENCOUNTER
Pt's daughter Radha calling for assistance in figuring out if pt provided her insurance information at the time of hospital check in.  Pt currently admitted at [facility].     No consent to communicate on file, RN unable to provide information.  RN advised Radha to contact the [facility]  in the AM.      She verbalized understanding and had no further questions.     Radha Alexis RN/MINOO    Additional Information    Negative: RN needs further essential information from caller in order to complete triage    Negative: Requesting regular office appointment    Negative: [1] Caller requesting NON-URGENT health information AND [2] PCP's office is the best resource    Negative: Health Information question, no triage required and triager able to answer question    Negative: General information question, no triage required and triager able to answer question    Negative: Question about upcoming scheduled test, no triage required and triager able to answer question    Negative: [1] Caller is not with the adult (patient) AND [2] probable NON-URGENT symptoms    [1] Follow-up call to recent contact AND [2] information only call, no triage required    Protocols used: INFORMATION ONLY CALL-A-

## 2020-11-24 NOTE — PLAN OF CARE
Nursing shift note  Pt here with R CVA. A&Ox4. Neuros intact ex L facial droop, LUE/LE hemiparesis (4/5), L slow & deliberate, L neglect (improved from yesterday, decreased sensation on L, possible L field cut?-- possibly very mild. VSS on RA & 5 ml dilt gtt until 1130. Tele a.fib CVR. Runs RVR with activity with therapy--ranging between 100-140s. Diet upgraded to DD3 diet, thin liquids. Takes pills whole. Up with Ax1GB & walker. Denies pain.  ECHO done. Discharge ARU once medically cleared.     ADDENDUM [id number]: No changes. Increased metoprolol PO dose this evening. Updated daughter, Radha today. Plan to discharge to ARU Friday due to bed availability. R chin bruise from PTA.     Behavior & Aggression Tool color: Green      Pt's belongings:   (Belongings from admission day present in pt's room)             11/23/20 3827   Initial Information   Patient Belongings Remaining with Patient cell phone/electronics;clothing;shoes         Home medications: no

## 2020-11-24 NOTE — PROGRESS NOTES
[facility]    Stroke Progress Note    Interval Events  Ms. Melgoza reports continued improvement in her symptoms, although she admits to feeling quite tired. I spoke with her daughter, Radha, and updated her on the MRI findings and plan of care.      Stroke Evaluation summarized:  MRI/Head CT: NCCT per HPI, MRI brain with areas of restricted diffusion in the right insula, left frontal lobe, and left occipital lobe  Intracranial Vascular Imaging: CTA head with R M2/M3 inferior division junction occlusion  Cervical Carotid and Vertebral Artery Vascular Imaging: mild atherosclerotic plaque in the right carotid bifurcation, atherosclerotic plaque with less than 50% stenosis in the left ICA   Echocardiogram: EF 60-65%, mild LAD, negative bubble  EKG/Telemetry: afib with RVR  LDL: awaiting  A1c:  awaiting  Troponin: <0.015  Other testing: Not Applicable       Impression  Ischemic Stroke due to cardioembolism - Bilateral anterior circulation infarcts and R M2/M3 occlusion in the setting of new onset atrial fibrillation. Not a candidate for tPA due to unknown LKW and not a candidate for mechanical thrombectomy due to the distal location of the occlusion.     Plan  - Given small amount of petechial hemorrhagic transformation in right MCA territory infarct, recommend waiting to start anticoagulation until day 7 post-stroke (11/29/20) with repeat CTH prior to initiation. Discussed cost of Eliquis 5 mg BID with patient and daughter and they are comfortable with this medication.  - LDL and A1c pending. Goal A1c <7.0. Continue Lipitor 40 mg daily with goal LDL 40-70.  -  No indication for permissive HTN at this time. Long term Goal BP <140/90 with tighter control associated with decreased overall CV risk, if tolerated  - Therapies  - PLC    Follow-Up:  - 1-2 weeks with PCP   - Hospital follow up for stroke clinic in 6-8 weeks at [facility] ([phone]). Referral placed in  "discharge orders: note, referral states \"neurosurgery\" but it is for stroke clinic.    Thank you for this consult. No further stroke evaluation is indicated, we will sign off. Please contact us with additional questions.       NEGAR Jackson, CNP  Neurology  To page me or covering stroke neurology team member, click here: AMCOM   Choose \"On Call\" tab at top, then search dropdown box for \"Neurology Adult\", select location, press Enter, then look for stroke/neuro ICU/telestroke.    ______________________________________________________    Medications   Home Meds  Prior to Admission medications    Medication Sig Start Date End Date Taking? Authorizing Provider   metFORMIN (GLUCOPHAGE) 1000 MG tablet Take 1,000 mg by mouth 2 times daily (with meals) 7/14/20  Yes Unknown, Entered By History   simvastatin (ZOCOR) 40 MG tablet Take 40 mg by mouth At Bedtime 7/14/20  Yes Unknown, Entered By History       Scheduled Meds    aspirin  325 mg Oral Daily    Or     aspirin  300 mg Rectal Daily     atorvastatin  40 mg Oral or NG Tube Daily at 8 pm     insulin aspart  1-4 Units Subcutaneous Q4H     metoprolol tartrate  25 mg Oral BID       Infusion Meds    dilTIAZem HCl-Sodium Chloride 5 mg/hr (11/24/20 0212)     - MEDICATION INSTRUCTIONS -       sodium chloride 125 mL/hr at 11/24/20 0141       PRN Meds  glucose **OR** dextrose **OR** glucagon, labetalol, - MEDICATION INSTRUCTIONS -       PHYSICAL EXAMINATION  Temp:  [97.5  F (36.4  C)-98.6  F (37  C)] 97.9  F (36.6  C)  Pulse:  [69-85] 73  Resp:  [14-16] 16  BP: ()/(51-76) 102/65  SpO2:  [92 %-99 %] 94 %     Neurologic  Mental Status:  alert, oriented x 3, follows commands, speech clear and fluent, naming and repetition normal  Cranial Nerves:  visual fields intact, EOMI with normal smooth pursuit, facial movements symmetric, hearing not formally tested but intact to conversation, no dysarthria, tongue protrusion midline  Motor:  normal muscle tone and bulk, no abnormal " movements, able to move all limbs spontaneously, LUE slight pronation without drift, RUE without drift, LLE with mild drift but doesn't hit bed, RLE without drift  Reflexes:  toes down-going  Sensory:  light touch sensation intact and symmetric throughout upper and lower extremities, extinction to tactile double simultaneous stimulation, inconsistent extinction to visual stimuli  Coordination:  FTN without dysmetria  Station/Gait:  deferred     Stroke Scales    NIHSS  Interval    Interval Comments     1a. Level of Consciousness 0-->Alert, keenly responsive   1b. LOC Questions 0-->Answers both questions correctly   1c. LOC Commands 0-->Performs both tasks correctly   2.   Best Gaze 0-->Normal   3.   Visual 0-->No visual loss   4.   Facial Palsy 0-->Normal symmetrical movements   5a. Motor Arm, Left 0-->No drift, limb holds 90 (or 45) degrees for full 10 secs   5b. Motor Arm, Right 0-->No drift, limb holds 90 (or 45) degrees for full 10 secs   6a. Motor Leg, Left 1-->Drift, leg falls by the end of the 5-sec period but does not hit bed   6b. Motor Leg, right 0-->No drift, leg holds 30 degree position for full 5 secs   7.   Limb Ataxia 0-->Absent   8.   Sensory 1-->Mild-to-moderate sensory loss, patient feels pinprick is less sharp or is dull on the affected side, or there is a loss of superficial pain with pinprick, but patient is aware of being touched   9.   Best Language 0-->No aphasia, normal   10. Dysarthria 0-->Normal   11. Extinction and Inattention  1-->Visual, tactile, auditory, spatial, or personal inattention or extinction to bilateral simultaneous stimulation in one of the sensory modalities   Total 3 (11/24/20 1642)       Imaging  I personally reviewed all imaging; relevant findings per HPI.     Lab Results Data   CBC  Recent Labs   Lab 11/22/20 2226   WBC 9.6   RBC 4.38   HGB 13.8   HCT 40.8        Basic Metabolic Panel    Recent Labs   Lab 11/22/20 2226      POTASSIUM 4.1   CHLORIDE 107    CO2 22   BUN 18   CR 0.84   *   KAROLYN 9.2     Liver Panel  Recent Labs   Lab 11/22/20 2226   PROTTOTAL 7.2   ALBUMIN 4.1   BILITOTAL 2.0*   ALKPHOS 47   AST 32   ALT 24     INR    Recent Labs   Lab Test 11/22/20 2226   INR 1.14      Lipid Profile  No lab results found.  A1C  No lab results found.  Troponin I    Recent Labs   Lab 11/22/20 2226   TROPI <0.015

## 2020-11-24 NOTE — PROGRESS NOTES
[facility]    Medicine Progress Note - Hospitalist Service       Date of Admission:  11/22/2020  Assessment & Plan       Abbey Melgoza is a 75-year-old female with history of diabetes, hyperlipidemia, new onset atrial fibrillation who presented with acute ischemic stroke, most likely secondary to embolism because of her atrial fibrillation.      Acute CVA, possibly embolic   The CT scan shows evidence of small to acute infarct involving the posterior right insular cortex extending into the lateral right basal ganglia and right corona radiata.  No associated hemorrhage or significant mass effect.  CT of the head shows acute  occlusion involving the right M2, M3 inferior division vessel.  CTA of the neck is negative for any hemodynamically significant stenosis in the proximal internal carotid artery.    * MRI Brain:  1. Areas of restricted diffusion in the right insula, left frontal lobe, and left occipital lobe. These are in separate vascular distributions. This pattern would be consistent with an embolic process.  2. There are areas of susceptibility hypointensity within the area of restricted diffusion in the right insula. This is suggestive of petechial hemorrhage. No gross parenchymal hemorrhage is identified.  * Echocardiogram:  1. Normal left ventricular size and function. Left ventricular ejection fraction of 60-65%. No segmental wall motion abnormalities noted.  2. The left atrium is mildly dilated. Right atrial size is normal. Negative Bubble study.  3. No hemodynamically significant valvular disease.  4. Pulmonary artery pressure of 30-35 mmhg based on a RA pressure of 15 mmHg.  5. Elevated RA pressure.  6. The rhythm was atrial fibrillation  - Continue full dose ASA   - Pharmacy liaison for DOAC coverage  - PT/OT/Speech consulted.  Likely ARU   - Stroke neurology following and appreciate their recommendations     Atrial fibrillation with rapid ventricular response  Initially  started on Cardizem drip but have been able to titrate off   - Stopped Diltiazem drip   - Started Lopressor 25 mg BID  - Pharmacy liaison as above     Hyperlipidemia  - Continue Lipitor     DM type II   PTA on Metformin  - Holding the metformin  - SSI         Diet: Room Service  Combination Diet Dysphagia Diet Level 3: Advanced; Thin Liquids (water, ice chips, juice, milk gelatin, ice cream, etc) (No straws)    DVT Prophylaxis: Pneumatic Compression Devices  Aldridge Catheter: not present  Code Status: DNR/DNI         Disposition Plan   Expected discharge: 1-2 days, recommended to ARU once ARU bed available.  Entered: Demetris Carlson DO 11/24/2020, 1:38 PM       The patient's care was discussed with the Bedside Nurse, Care Coordinator/ and Patient.    Demetris Carlson DO  Hospitalist Service  [facility]  Contact information available via [facility] Paging/Directory    ______________________________________________________________________    Interval History   Patient seen and examined.  No acute events over night.  Patient still weak on the left but improving. No pain or trouble breathing.  Tolerating diet.      Data reviewed today: I reviewed all medications, new labs and imaging results over the last 24 hours. I personally reviewed no images or EKG's today.    Physical Exam   Vital Signs: Temp: (!) 87  F (30.6  C) Temp src: Oral BP: 119/79 Pulse: 85   Resp: 16 SpO2: 94 % O2 Device: None (Room air)    Weight: 0 lbs 0 oz  General Appearance: Resting comfortably.  NAD   Respiratory: Clear to auscultation.  No respiratory distress  Cardiovascular: Irregular rhythm but rate controlled.  No obvious murmurs  GI: Bowel sounds noted.  Non-tender  Skin: No obvious rashes or cyanosis  Other: Left facial droop noted.  No lower extremity edema     Data   Recent Labs   Lab 11/22/20  2226   WBC 9.6   HGB 13.8   MCV 93      INR 1.14      POTASSIUM 4.1   CHLORIDE 107   CO2 22   BUN  18   CR 0.84   ANIONGAP 10   KAROLYN 9.2   *   ALBUMIN 4.1   PROTTOTAL 7.2   BILITOTAL 2.0*   ALKPHOS 47   ALT 24   AST 32   TROPI <0.015     Recent Results (from the past 24 hour(s))   MRI Brain w & w/o contrast    Narrative    MRI BRAIN WITHOUT AND WITH CONTRAST  11/23/2020 5:49 PM    HISTORY:  left sided weakness     TECHNIQUE:  Multiplanar, multisequence MRI of the brain without and  with 7 mL Gadavist    COMPARISON: CT studies dated 11/22/2020    FINDINGS:     Intracranial contents: Diffusion-weighted images reveal an area of  restricted diffusion in the right insula extending into the right  frontal operculum. This corresponds to the perfusion defect seen on  the Tmax images. Measures approximately 5.8 cm in AP dimension by 3.8  cm in transverse dimension and 3.6 cm in cephalocaudad dimension.  There are areas of susceptibility hypointensity within the areas of  restricted diffusion. This is probably due to petechial hemorrhage.  There are areas of restricted diffusion in the left frontal lobe. This  measures about 1.7 cm in greatest dimension. A small area of  restricted diffusion is also seen in the left occipital lobe. This  measures about 7 mm in greatest dimension.  There are no gadolinium  enhancing lesions. The arteries at the base of the brain and the dural  venous sinuses appear patent.     Sella:  No significant abnormality accounting for technique.     Orbit: No significant abnormality accounting for technique.      Sinus/mastoids: No significant paranasal sinus mucosal disease. No  significant middle ear or mastoid effusion.    Bones/soft tissues: No aggressive osseous lesion involving the  calvarium, skull base, or visualized upper cervical spine.       Impression    IMPRESSION:    1. Areas of restricted diffusion in the right insula, left frontal  lobe, and left occipital lobe. These are in separate vascular  distributions. This pattern would be consistent with an embolic  process.  2. There  are areas of susceptibility hypointensity within the area of  restricted diffusion in the right insula. This is suggestive of  petechial hemorrhage. No gross parenchymal hemorrhage is identified.      ALIZE BUENO MD   Echocardiogram Complete - Bubble study    Narrative    [id number]  [id number]  [id number]  [id number]^CHARLEY^PATRICIA^HANNA           [facility]  Echocardiography Laboratory  [address] [place] [address]        Name: BRANDEN ARMIJO  MRN: [id number]  : 1945  Study Date: 2020 08:03 AM  Age: 75 yrs  Gender: Female  Patient Location: [facility]  Reason For Study: TIA, Atrial Fibrillation  Ordering Physician: PATRICIA WHITEHEAD  Referring Physician: PATRICIA WHITEHEAD  Performed By: LORI Hill     BSA: 1.9 m2  Height: 66 in  Weight: 168 lb  HR: 102  BP: 110/73 mmHg  _____________________________________________________________________________  __        Procedure  Optison (NDC #3547-2331) given intravenously. Complete Portable Bubble Echo  Adult.  _____________________________________________________________________________  __        Interpretation Summary     1. Normal left ventricular size and function. Left ventricular ejection  fraction of 60-65%. No segmental wall motion abnormalities noted.  2. The left atrium is mildly dilated. Right atrial size is normal. Negative  Bubble study.  3. No hemodynamically significant valvular disease.  4. Pulmonary artery pressure of 30-35 mmhg based on a RA pressure of 15 mmHg.  5. Elevated RA pressure.  6. The rhythm was atrial fibrillation  No prior study for comparison. Technically difficult study.  _____________________________________________________________________________  __        Left Ventricle  The left ventricle is normal in size. There is normal left ventricular wall  thickness. Left ventricular systolic function is normal. The visual ejection  fraction is estimated at 60-65%. Diastolic function not assessed due to atrial  fibrillation.  No regional wall motion abnormalities noted.     Right Ventricle  The right ventricle is not well visualized.     Atria  The left atrium is mildly dilated. Right atrial size is normal. A contrast  injection (Bubble Study) was performed that was negative for flow across the  interatrial septum.     Mitral Valve  The mitral valve leaflets appear normal. There is no evidence of stenosis,  fluttering, or prolapse. There is mild (1+) mitral regurgitation.        Tricuspid Valve  Normal tricuspid valve. There is mild (1+) tricuspid regurgitation. The right  ventricular systolic pressure is approximated at 20mmHg plus the right atrial  pressure.     Aortic Valve  The aortic valve is trileaflet. No aortic regurgitation is present. No aortic  stenosis is present.     Pulmonic Valve  The pulmonic valve is not well visualized. There is trace pulmonic valvular  regurgitation.     Vessels  The aortic root is normal size. Normal size ascending aorta. IVC diameter >2.1  cm collapsing <50% with sniff suggests a high RA pressure estimated at 15 mmHg  or greater.     Pericardium  There is no pericardial effusion.        Rhythm  The rhythm was atrial fibrillation.  _____________________________________________________________________________  __  MMode/2D Measurements & Calculations  IVSd: 1.1 cm     LVIDd: 3.5 cm  LVIDs: 3.1 cm  LVPWd: 1.2 cm  FS: 12.6 %  LV mass(C)d: 126.0 grams  LV mass(C)dI: 67.9 grams/m2  Ao root diam: 3.2 cm  LA dimension: 4.1 cm  LA/Ao: 1.3  LVOT diam: 1.9 cm  LVOT area: 2.8 cm2  LA Volume (BP): 57.9 ml  LA Volume Indexed (AL/bp): 29.3 ml/m2  RWT: 0.65           Doppler Measurements & Calculations  MV E max beltran: 100.0 cm/sec  MV dec time: 0.14 sec  PA acc time: 0.11 sec  E/E' av.3  Lateral E/e': 6.7  Medial E/e': 7.9           _____________________________________________________________________________  __           Report approved by: Fred So 2020 11:01 AM

## 2020-11-24 NOTE — PLAN OF CARE
Pt. A&Ox4. VSS on Ra ex hypotention (low 100's). Denies pain. Tele Afib CVR. Up A+1 gb/w. Tolerating Dd2, nectar thick diet. CMS intact. Neuros: L. Field cut, R facial droop, L neglect, slight L pronator drift and hemiparesis (4/5), and slow to respond. On dilt drip 5ml/hr with parameters in MAR. Bruising on r. Chin from fall. , 113. PT/OT recommend ARU. Pt scoring green on aggression stoplight tool. Discharge pending. Continue to monitor.

## 2020-11-24 NOTE — PROGRESS NOTES
Nursing shift note  Pt here with right CVA. A&O X4 but forgetful at times. Neuros left: neglect, field cut, facial droop, pronator drift and slow to respond. VSS. Tele A-fib. DD2 diet, nectar thick liquids. Takes pills whole with water. Up with  Ax1 GB. Denies pain. Pt had MRI done this evening and tollerated well. The diltiazem gtt was reduced to 5mL- reference other note. Pt scoring green on the Aggression Stop Light Tool. Plan for discharge to ARU.    Behavior & Aggression Tool color: green      Pt's belongings:   (Belongings from admission day present in pt's room)                Home medications: N

## 2020-11-24 NOTE — PROVIDER NOTIFICATION
"Paged Dr. Carlson, \"pts HR ranging in low 100s to occasional 120s while in bed. Do you want metoprolol IV PRN if sustaining >120 thanks  "

## 2020-11-24 NOTE — PROVIDER NOTIFICATION
Paged Elvia Shanks- PA about Pt Diltiazem drip and starting metoprolol. Per PA we are to titrate drip down once metoprolol is given.    RN gave metoprolol at 22:00 and titrated diltiazem down to 5mL gtt right after. Will reassess in 2 hours and follow MAR instructions:     Titrate by 5 mg/hr every 2 hours to a HR less than 100 beats per minute,  while still maintaining a SBP greater than 100 mmHg.

## 2020-11-24 NOTE — PHARMACY-ADMISSION MEDICATION HISTORY
"Pharmacy Medication History  Admission medication history interview status for the 11/22/2020  admission is complete. See EPIC admission navigator for prior to admission medications       Medication history sources: Patient  Location of interview: phone  Medication history source reliability: Good  Adherence assessment: Good    Significant changes made to the medication list:  Added vitamin D. Pt not 100% sure of dose.  Thinks it's \"500mg\" but that is not a typical strength for vitamin D       Additional medication history information:   n/a    Medication reconciliation completed by provider prior to medication history? Yes    Time spent in this activity: 10min      Prior to Admission medications    Medication Sig Last Dose Taking? Auth Provider   metFORMIN (GLUCOPHAGE) 1000 MG tablet Take 1,000 mg by mouth 2 times daily (with meals) 11/21/2020 Yes Unknown, Entered By History   simvastatin (ZOCOR) 40 MG tablet Take 40 mg by mouth At Bedtime 11/21/2020 Yes Unknown, Entered By History   vitamin D3 (CHOLECALCIFEROL) 50 mcg (2000 units) tablet Take 1 tablet by mouth 2 times daily (pt not 100% sure of dose) 11/22/2020 Yes Unknown, Entered By History     Massiel Otero, PharmD      "

## 2020-11-25 ENCOUNTER — APPOINTMENT (OUTPATIENT)
Dept: OCCUPATIONAL THERAPY | Facility: CLINIC | Age: 75
DRG: 064 | End: 2020-11-25
Payer: COMMERCIAL

## 2020-11-25 ENCOUNTER — APPOINTMENT (OUTPATIENT)
Dept: PHYSICAL THERAPY | Facility: CLINIC | Age: 75
DRG: 064 | End: 2020-11-25
Payer: COMMERCIAL

## 2020-11-25 ENCOUNTER — APPOINTMENT (OUTPATIENT)
Dept: SPEECH THERAPY | Facility: CLINIC | Age: 75
DRG: 064 | End: 2020-11-25
Payer: COMMERCIAL

## 2020-11-25 LAB
CHOLEST SERPL-MCNC: 117 MG/DL
GLUCOSE BLDC GLUCOMTR-MCNC: 108 MG/DL (ref 70–99)
GLUCOSE BLDC GLUCOMTR-MCNC: 114 MG/DL (ref 70–99)
GLUCOSE BLDC GLUCOMTR-MCNC: 153 MG/DL (ref 70–99)
GLUCOSE BLDC GLUCOMTR-MCNC: 155 MG/DL (ref 70–99)
GLUCOSE BLDC GLUCOMTR-MCNC: 96 MG/DL (ref 70–99)
HDLC SERPL-MCNC: 57 MG/DL
LDLC SERPL CALC-MCNC: 36 MG/DL
NONHDLC SERPL-MCNC: 60 MG/DL
TRIGL SERPL-MCNC: 119 MG/DL

## 2020-11-25 PROCEDURE — 97535 SELF CARE MNGMENT TRAINING: CPT | Mod: GO | Performed by: OCCUPATIONAL THERAPY ASSISTANT

## 2020-11-25 PROCEDURE — 80061 LIPID PANEL: CPT | Performed by: INTERNAL MEDICINE

## 2020-11-25 PROCEDURE — 250N000013 HC RX MED GY IP 250 OP 250 PS 637: Performed by: INTERNAL MEDICINE

## 2020-11-25 PROCEDURE — 97112 NEUROMUSCULAR REEDUCATION: CPT | Mod: GP

## 2020-11-25 PROCEDURE — 999N001017 HC STATISTIC GLUCOSE BY METER IP

## 2020-11-25 PROCEDURE — 99232 SBSQ HOSP IP/OBS MODERATE 35: CPT | Performed by: INTERNAL MEDICINE

## 2020-11-25 PROCEDURE — 250N000009 HC RX 250: Performed by: INTERNAL MEDICINE

## 2020-11-25 PROCEDURE — 92526 ORAL FUNCTION THERAPY: CPT | Mod: GN | Performed by: SPEECH-LANGUAGE PATHOLOGIST

## 2020-11-25 PROCEDURE — 36415 COLL VENOUS BLD VENIPUNCTURE: CPT | Performed by: INTERNAL MEDICINE

## 2020-11-25 PROCEDURE — 120N000001 HC R&B MED SURG/OB

## 2020-11-25 RX ORDER — METOPROLOL TARTRATE 50 MG
100 TABLET ORAL 2 TIMES DAILY
Status: DISCONTINUED | OUTPATIENT
Start: 2020-11-25 | End: 2020-11-25

## 2020-11-25 RX ORDER — METOPROLOL TARTRATE 1 MG/ML
5 INJECTION, SOLUTION INTRAVENOUS ONCE
Status: COMPLETED | OUTPATIENT
Start: 2020-11-25 | End: 2020-11-25

## 2020-11-25 RX ORDER — METOPROLOL TARTRATE 50 MG
100 TABLET ORAL 2 TIMES DAILY
Status: DISCONTINUED | OUTPATIENT
Start: 2020-11-25 | End: 2020-11-30 | Stop reason: HOSPADM

## 2020-11-25 RX ORDER — ACETAMINOPHEN 650 MG/1
650 SUPPOSITORY RECTAL EVERY 4 HOURS PRN
Status: DISCONTINUED | OUTPATIENT
Start: 2020-11-25 | End: 2020-11-30 | Stop reason: HOSPADM

## 2020-11-25 RX ORDER — ACETAMINOPHEN 325 MG/1
650 TABLET ORAL EVERY 4 HOURS PRN
Status: DISCONTINUED | OUTPATIENT
Start: 2020-11-25 | End: 2020-11-30 | Stop reason: HOSPADM

## 2020-11-25 RX ADMIN — METOPROLOL TARTRATE 5 MG: 5 INJECTION INTRAVENOUS at 16:15

## 2020-11-25 RX ADMIN — ATORVASTATIN CALCIUM 40 MG: 40 TABLET, FILM COATED ORAL at 20:26

## 2020-11-25 RX ADMIN — METOPROLOL TARTRATE 100 MG: 50 TABLET, FILM COATED ORAL at 18:41

## 2020-11-25 RX ADMIN — METOPROLOL TARTRATE 50 MG: 50 TABLET, FILM COATED ORAL at 07:52

## 2020-11-25 RX ADMIN — ASPIRIN 325 MG: 325 TABLET, COATED ORAL at 07:51

## 2020-11-25 ASSESSMENT — ACTIVITIES OF DAILY LIVING (ADL)
ADLS_ACUITY_SCORE: 18
ADLS_ACUITY_SCORE: 20
ADLS_ACUITY_SCORE: 18
ADLS_ACUITY_SCORE: 18

## 2020-11-25 NOTE — PHARMACY-RX INSURANCE COVERAGE
Discharge Pharmacy Test Claim    Ran test script for xarelto and eliquis through pt's [facility] Medicare Part D insurance. Pt has $350 remaining in her prescription deductible. Initial fill of xarelto or eliquis will be $385.70 and refill will be $35.70/mo. [facility] pharmacy has 30-day free trial vouchers for either DOAC.      Eun Alejo  [facility] Pharm[facility] Liaison  Ph: [phone] Page: [phone]

## 2020-11-25 NOTE — PROGRESS NOTES
Care Management Follow Up    Length of Stay (days): 2    Expected Discharge Date: 11/27/20(ARU)     Concerns to be Addressed: discharge planning     Patient plan of care discussed at interdisciplinary rounds: Yes    Anticipated Discharge Disposition:       Anticipated Discharge Services:    Anticipated Discharge DME:      Patient/family educated on Medicare website which has current facility and service quality ratings:    Education Provided on the Discharge Plan:    Patient/Family in Agreement with the Plan:      Referrals Placed by CM/SW:    Private pay costs discussed: transportation costs, possible    Additional Information:    NASIM spoke w/pt's daughter, who requested an update on pt's discharge plan. [facility] Should patient improve beyond the need for ARU, TCU may be recommended or home w/Home Care/Therapies. Dtr appeared to understand all information discussed, stating she prefers pt not discharge home, but is aware of the COVID concerns at ARU/TCU. Dtr stated she will call SW again on 11/27. (SW was given dtr's phone number, however, [facility]  was not able to complete the call to [place] w/the number provided).      Jasmina Mahan SUYAPA   [facility]  [phone]    UPDATE@8583: [facility] liaison calls to state pt is doing too good for ARU at this point, thus they will not take patient when a bed opens up. Pt may benefit from TCU, if able to find a bed over Thanksgiving holiday. If pt remains at [facility] until Friday, pt may be able to discharge home with home care.       UPDATE@3265: SW received a call from Alec at [facility] stating pt's daughter (from [place]) has called them asking to review pt for possible TCU placement. SW did send referral thru DOD. Will await a callback from Alec on Friday. Daughters email is:  [email or web address]

## 2020-11-25 NOTE — PLAN OF CARE
Nursing shift note  [id number]: Pt here with R CVA. A&Ox4. Neuros intact ex L facial droop, LUE/LE hemiparesis (4/5), L neglect (improved from yesterday, decreased sensation on L, possible L field cut?-- possibly very mild. VSS on RA. Tele a.fib CVR. DD3 diet, thin liquids. Takes pills whole. Up with Ax1GB. C/o generalized R sided pain r/t fall--tylenol now ordered. Discharge to ARU Friday once bed available.          Behavior & Aggression Tool color: Green        Pt's belongings:   (Belongings from admission day present in pt's room)     --clothes, cellphone, slippers (pt asking about purse & glasses--has not been noted in the room since Monday--likely at home. Asked if she could have a friend check at her home.           11/23/20 0154   Initial Information   Patient Belongings Remaining with Patient cell phone/electronics;clothing;shoes            Home medications: no

## 2020-11-25 NOTE — PROVIDER NOTIFICATION
Pts -140, other VSS. Patient asymptomatic. Updated Dr. Carlson, awaiting additional orders.     Callback received, Dr. Carlson does not want to order PRNs, but gave a one time dose of IV metoprolol.

## 2020-11-25 NOTE — PROGRESS NOTES
[facility]    Medicine Progress Note - Hospitalist Service       Date of Admission:  11/22/2020  Assessment & Plan       Abbey Melgoza is a 75-year-old female with history of diabetes, hyperlipidemia, new onset atrial fibrillation who presented with acute ischemic stroke, most likely secondary to embolism because of her atrial fibrillation.      Acute CVA, possibly embolic   The CT scan shows evidence of small to acute infarct involving the posterior right insular cortex extending into the lateral right basal ganglia and right corona radiata.  No associated hemorrhage or significant mass effect.  CT of the head shows acute  occlusion involving the right M2, M3 inferior division vessel.  CTA of the neck is negative for any hemodynamically significant stenosis in the proximal internal carotid artery.    * MRI Brain:  1. Areas of restricted diffusion in the right insula, left frontal lobe, and left occipital lobe. These are in separate vascular distributions. This pattern would be consistent with an embolic process.  2. There are areas of susceptibility hypointensity within the area of restricted diffusion in the right insula. This is suggestive of petechial hemorrhage. No gross parenchymal hemorrhage is identified.  * Echocardiogram:  1. Normal left ventricular size and function. Left ventricular ejection fraction of 60-65%. No segmental wall motion abnormalities noted.  2. The left atrium is mildly dilated. Right atrial size is normal. Negative Bubble study.  3. No hemodynamically significant valvular disease.  4. Pulmonary artery pressure of 30-35 mmhg based on a RA pressure of 15 mmHg.  5. Elevated RA pressure.  6. The rhythm was atrial fibrillation  - Continue full dose ASA   - PT/OT/Speech consulted.  Likely ARU   - Stroke neurology following and appreciate their recommendations     Atrial fibrillation with rapid ventricular response  Initially started on Cardizem drip but have been  able to titrate off   - Continue Lopressor 25 mg BID  - Pharmacy liaison consulted for DOAC.  Appears as if the best option is Xarelto/Eliquis vs Coumadin and will need to disdcuss with patient      Hyperlipidemia  - Continue Lipitor     DM type II   PTA on Metformin  - Holding the metformin  - SSI        Diet: Room Service  Combination Diet Regular Diet Adult    DVT Prophylaxis: Pneumatic Compression Devices  Aldridge Catheter: not present  Code Status: No CPR- Do NOT Intubate           Disposition Plan   Expected discharge: TBD, recommended to ARU once ARU bed available.  Patient is medically stable for discharge and just needs an ARU bed   Entered: Demetris Carlson DO 11/25/2020, 2:32 PM       The patient's care was discussed with the Bedside Nurse, Care Coordinator/ and Patient.    Demetris Carlson DO  Hospitalist Service  [facility]  Contact information available via [facility] Paging/Directory    ______________________________________________________________________    Interval History   Patient seen and examined.  No acute events over night.  No fevers or chills.  No pain currently.  No difficulty breathing.  No nausea or vomiting     Data reviewed today: I reviewed all medications, new labs and imaging results over the last 24 hours. I personally reviewed no images or EKG's today.    Physical Exam   Vital Signs: Temp: 97.9  F (36.6  C) Temp src: Oral BP: 126/63 Pulse: 68   Resp: 16 SpO2: 100 % O2 Device: None (Room air)    Weight: 0 lbs 0 oz  General Appearance: Resting comfortably.  NAD   Respiratory: Clear to auscultation.  No respiratory distress  Cardiovascular: Sounds regular at this time.  No obvious murmurs  GI: Bowel sounds present.  Non-tender  Skin: No obvious rashes or cyanosis  Other: No edema.  No calf tenderness.  Left facial droop noted      Data   Recent Labs   Lab 11/22/20  2226   WBC 9.6   HGB 13.8   MCV 93      INR 1.14      POTASSIUM 4.1    CHLORIDE 107   CO2 22   BUN 18   CR 0.84   ANIONGAP 10   KAROLYN 9.2   *   ALBUMIN 4.1   PROTTOTAL 7.2   BILITOTAL 2.0*   ALKPHOS 47   ALT 24   AST 32   TROPI <0.015     No results found for this or any previous visit (from the past 24 hour(s)).

## 2020-11-25 NOTE — PLAN OF CARE
Pt here with R CVA. Neuro's slight L facial droop, LUE and LLE slightly weaker than R. L field cut. A&O times 4. VSS. Tele A-fib wt CVR. DD3 diet with thin liquids, takes pills whole with water. Up with assist of one with GB and walker. Denies pain. Pt scoring green on the Aggression Stop Light Tool. Plan to discharge to ARU when bed available.

## 2020-11-26 ENCOUNTER — APPOINTMENT (OUTPATIENT)
Dept: OCCUPATIONAL THERAPY | Facility: CLINIC | Age: 75
DRG: 064 | End: 2020-11-26
Payer: COMMERCIAL

## 2020-11-26 LAB
GLUCOSE BLDC GLUCOMTR-MCNC: 101 MG/DL (ref 70–99)
GLUCOSE BLDC GLUCOMTR-MCNC: 112 MG/DL (ref 70–99)
GLUCOSE BLDC GLUCOMTR-MCNC: 120 MG/DL (ref 70–99)
GLUCOSE BLDC GLUCOMTR-MCNC: 122 MG/DL (ref 70–99)
GLUCOSE BLDC GLUCOMTR-MCNC: 91 MG/DL (ref 70–99)

## 2020-11-26 PROCEDURE — 97535 SELF CARE MNGMENT TRAINING: CPT | Mod: GO

## 2020-11-26 PROCEDURE — 99232 SBSQ HOSP IP/OBS MODERATE 35: CPT | Performed by: INTERNAL MEDICINE

## 2020-11-26 PROCEDURE — 250N000013 HC RX MED GY IP 250 OP 250 PS 637: Performed by: INTERNAL MEDICINE

## 2020-11-26 PROCEDURE — 120N000001 HC R&B MED SURG/OB

## 2020-11-26 PROCEDURE — 999N001017 HC STATISTIC GLUCOSE BY METER IP

## 2020-11-26 RX ADMIN — ATORVASTATIN CALCIUM 40 MG: 40 TABLET, FILM COATED ORAL at 19:41

## 2020-11-26 RX ADMIN — METOPROLOL TARTRATE 100 MG: 50 TABLET, FILM COATED ORAL at 19:41

## 2020-11-26 RX ADMIN — METOPROLOL TARTRATE 100 MG: 50 TABLET, FILM COATED ORAL at 08:21

## 2020-11-26 RX ADMIN — ASPIRIN 325 MG: 325 TABLET, COATED ORAL at 08:21

## 2020-11-26 ASSESSMENT — ACTIVITIES OF DAILY LIVING (ADL)
ADLS_ACUITY_SCORE: 18

## 2020-11-26 NOTE — PLAN OF CARE
Patient admitted with a right sided stroke. A&0x4, tachycardic this afternoon/evening, other VSS. CMS intact. Neuros with very slight left sided weakness and possible neglect on the left side. Ambulating with SBA/GB. Reporting mild right neck pain from fall, declined tylenol. Upgraded to regular diet today, tolerating well. Tele afib with cvr/rvr, one dose of IV metoprolol given and Dr. Carlson increased PO metoprolol dose and this was given early. Discharge plan pending.

## 2020-11-26 NOTE — PLAN OF CARE
Nursing shift note  [id number]: Pt here with R CVA. A&Ox4. Neuros intact ex slight L facial droop, slight LUE/LE hemiparesis (+4/5), slight L neglect--continues to improve daily. VSS on RA. Tele a.fib CVR with occasional RVR max 135 very briefly--otherwise ranging between 90s-118. Regular diet. Takes pills whole. Up with SBA GB. C/o generalized R sided achy pain r/t fall. Too good for ARU per SW last note-- plan for TCU at discharge once bed available. Start Eliquis 11/29 with CT prior. Talked to neuro if CT could be done sooner than 11/29--the stroke fellow stated he would review chart & call back with update.    [id number]: No neuro changes. HR ranging between --does not sustain in 120 for long. Gave scheduled metoprolol 30 minutes early. Now improved into the 80s. Ambulated in the halls. Plan for TCU at discharge---put daughter preferences in sticky note for SW.     Behavior & Aggression Tool color: Green        Pt's belongings:   (Belongings from admission day present in pt's room)         11/23/20 0154   Initial Information   Patient Belongings Remaining with Patient cell phone/electronics;clothing;shoes            Home medications: no

## 2020-11-26 NOTE — PROGRESS NOTES
[facility]    Medicine Progress Note - Hospitalist Service       Date of Admission:  11/22/2020  Assessment & Plan       Abbey Melgoza is a 75-year-old female with history of diabetes, hyperlipidemia, new onset atrial fibrillation who presented with acute ischemic stroke, most likely secondary to embolism because of her atrial fibrillation.      Acute CVA, possibly embolic   The CT scan shows evidence of small to acute infarct involving the posterior right insular cortex extending into the lateral right basal ganglia and right corona radiata.  No associated hemorrhage or significant mass effect.  CT of the head shows acute  occlusion involving the right M2, M3 inferior division vessel.  CTA of the neck is negative for any hemodynamically significant stenosis in the proximal internal carotid artery.    * MRI Brain:  1. Areas of restricted diffusion in the right insula, left frontal lobe, and left occipital lobe. These are in separate vascular distributions. This pattern would be consistent with an embolic process.  2. There are areas of susceptibility hypointensity within the area of restricted diffusion in the right insula. This is suggestive of petechial hemorrhage. No gross parenchymal hemorrhage is identified.  * Echocardiogram:  1. Normal left ventricular size and function. Left ventricular ejection fraction of 60-65%. No segmental wall motion abnormalities noted.  2. The left atrium is mildly dilated. Right atrial size is normal. Negative Bubble study.  3. No hemodynamically significant valvular disease.  4. Pulmonary artery pressure of 30-35 mmhg based on a RA pressure of 15 mmHg.  5. Elevated RA pressure.  6. The rhythm was atrial fibrillation  - Full dose ASA   - Start Eliquis 7 days post stroke with repeat CT head prior to initiation  - PT/OT/Speech consulted.  Initially recommending ARU but now appears to have improved to the point of recommending TCU   - Stroke neurology  following and appreciate their recommendations     Atrial fibrillation with rapid ventricular response  Initially started on Cardizem drip but have been able to titrate off   - Lopressor titrated up to 100 mg BID  - If continues to have issues with rate control may need to consider a different rate controlling agent   - Starting Eliquis 5 mg BID      Hyperlipidemia  - Continue Lipitor     DM type II   PTA on Metformin  - Holding the metformin  - SSI        Diet: Room Service  Combination Diet Regular Diet Adult    DVT Prophylaxis: Eliquis  Aldridge Catheter: not present  Code Status: No CPR- Do NOT Intubate           Disposition Plan   Expected discharge: Tomorrow, recommended to transitional care unit once safe disposition plan/ TCU bed available.  Entered: Demetris Carlson DO 11/26/2020, 10:06 AM       The patient's care was discussed with the Bedside Nurse and Patient.    Demetris Carlson DO  Hospitalist Service  [facility]  Contact information available via [facility] Paging/Directory    ______________________________________________________________________    Interval History   Patient seen and examined.  No acute events over night.  Still having some trouble with rate control.  Weakness improving.  Vision deficit also appears to be improving.  No fevers.  No pain     Data reviewed today: I reviewed all medications, new labs and imaging results over the last 24 hours. I personally reviewed no images or EKG's today.    Physical Exam   Vital Signs: Temp: 97.4  F (36.3  C) Temp src: Oral BP: 138/89 Pulse: 88   Resp: 16 SpO2: 98 % O2 Device: None (Room air)    Weight: 0 lbs 0 oz  General Appearance: Resting comfortably.  NAD   Respiratory: Clear to auscultation.  No respiratory distress  Cardiovascular: Irregularly irregular.  No obvious murmus  GI: Bowel sounds present.  Non-tender  Skin: Right facial bruising noted.  No obvious rashes or cyanosis  Other: No edema.  Slight left facial droop       Data   Recent Labs   Lab 11/22/20  2226   WBC 9.6   HGB 13.8   MCV 93      INR 1.14      POTASSIUM 4.1   CHLORIDE 107   CO2 22   BUN 18   CR 0.84   ANIONGAP 10   KAROLYN 9.2   *   ALBUMIN 4.1   PROTTOTAL 7.2   BILITOTAL 2.0*   ALKPHOS 47   ALT 24   AST 32   TROPI <0.015     No results found for this or any previous visit (from the past 24 hour(s)).

## 2020-11-26 NOTE — PLAN OF CARE
Pt here CVA in new A fib RVR. A&O x4. Neuros with slight L droop, slight L sided weakness and neglect. HR tachy up to 115. Other VSS. Tele A fib CVR/RVR. Regular diet, thin liquids. Takes pills whole with water. Up with 1 and GB. Denies pain. Referral out to TCU, discharge pending.    Behavior & Aggression Tool color: Green    Pt's belongings: Belongings from admission day present in pt's room              Home medications: N

## 2020-11-27 ENCOUNTER — APPOINTMENT (OUTPATIENT)
Dept: PHYSICAL THERAPY | Facility: CLINIC | Age: 75
DRG: 064 | End: 2020-11-27
Payer: COMMERCIAL

## 2020-11-27 ENCOUNTER — APPOINTMENT (OUTPATIENT)
Dept: SPEECH THERAPY | Facility: CLINIC | Age: 75
DRG: 064 | End: 2020-11-27
Payer: COMMERCIAL

## 2020-11-27 ENCOUNTER — APPOINTMENT (OUTPATIENT)
Dept: OCCUPATIONAL THERAPY | Facility: CLINIC | Age: 75
DRG: 064 | End: 2020-11-27
Payer: COMMERCIAL

## 2020-11-27 LAB
CREAT SERPL-MCNC: 0.97 MG/DL (ref 0.52–1.04)
GFR SERPL CREATININE-BSD FRML MDRD: 57 ML/MIN/{1.73_M2}
GLUCOSE BLDC GLUCOMTR-MCNC: 116 MG/DL (ref 70–99)
GLUCOSE BLDC GLUCOMTR-MCNC: 133 MG/DL (ref 70–99)
GLUCOSE BLDC GLUCOMTR-MCNC: 97 MG/DL (ref 70–99)
GLUCOSE BLDC GLUCOMTR-MCNC: 99 MG/DL (ref 70–99)

## 2020-11-27 PROCEDURE — 97530 THERAPEUTIC ACTIVITIES: CPT | Mod: GO

## 2020-11-27 PROCEDURE — 250N000013 HC RX MED GY IP 250 OP 250 PS 637: Performed by: INTERNAL MEDICINE

## 2020-11-27 PROCEDURE — 97110 THERAPEUTIC EXERCISES: CPT | Mod: GP | Performed by: PHYSICAL THERAPIST

## 2020-11-27 PROCEDURE — 97112 NEUROMUSCULAR REEDUCATION: CPT | Mod: GP | Performed by: PHYSICAL THERAPIST

## 2020-11-27 PROCEDURE — 36415 COLL VENOUS BLD VENIPUNCTURE: CPT | Performed by: INTERNAL MEDICINE

## 2020-11-27 PROCEDURE — 92526 ORAL FUNCTION THERAPY: CPT | Mod: GN

## 2020-11-27 PROCEDURE — 99232 SBSQ HOSP IP/OBS MODERATE 35: CPT | Performed by: HOSPITALIST

## 2020-11-27 PROCEDURE — 250N000009 HC RX 250: Performed by: HOSPITALIST

## 2020-11-27 PROCEDURE — 999N001017 HC STATISTIC GLUCOSE BY METER IP

## 2020-11-27 PROCEDURE — 120N000001 HC R&B MED SURG/OB

## 2020-11-27 PROCEDURE — 97535 SELF CARE MNGMENT TRAINING: CPT | Mod: GO

## 2020-11-27 PROCEDURE — 82565 ASSAY OF CREATININE: CPT | Performed by: INTERNAL MEDICINE

## 2020-11-27 RX ORDER — METOPROLOL TARTRATE 1 MG/ML
2.5 INJECTION, SOLUTION INTRAVENOUS EVERY 4 HOURS PRN
Status: DISCONTINUED | OUTPATIENT
Start: 2020-11-27 | End: 2020-11-30 | Stop reason: HOSPADM

## 2020-11-27 RX ADMIN — METOPROLOL TARTRATE 100 MG: 50 TABLET, FILM COATED ORAL at 20:16

## 2020-11-27 RX ADMIN — METOPROLOL TARTRATE 2.5 MG: 5 INJECTION INTRAVENOUS at 13:31

## 2020-11-27 RX ADMIN — METOPROLOL TARTRATE 100 MG: 50 TABLET, FILM COATED ORAL at 08:05

## 2020-11-27 RX ADMIN — ATORVASTATIN CALCIUM 40 MG: 40 TABLET, FILM COATED ORAL at 20:16

## 2020-11-27 RX ADMIN — METOPROLOL TARTRATE 2.5 MG: 5 INJECTION INTRAVENOUS at 17:34

## 2020-11-27 RX ADMIN — ASPIRIN 325 MG: 325 TABLET, COATED ORAL at 08:05

## 2020-11-27 ASSESSMENT — ACTIVITIES OF DAILY LIVING (ADL)
CONCENTRATING,_REMEMBERING_OR_MAKING_DECISIONS_DIFFICULTY: NO
ADLS_ACUITY_SCORE: 16
WALKING_OR_CLIMBING_STAIRS: AMBULATION DIFFICULTY, ASSISTANCE 1 PERSON
DRESSING/BATHING_DIFFICULTY: NO
ADLS_ACUITY_SCORE: 18
TOILETING_ISSUES: YES
ADLS_ACUITY_SCORE: 16
DIFFICULTY_COMMUNICATING: NO
DIFFICULTY_EATING/SWALLOWING: NO
WALKING_OR_CLIMBING_STAIRS_DIFFICULTY: NO
ADLS_ACUITY_SCORE: 18
WEAR_GLASSES_OR_BLIND: NO
ADLS_ACUITY_SCORE: 16
ADLS_ACUITY_SCORE: 18

## 2020-11-27 NOTE — PLAN OF CARE
Pt here with RCVA and Afib. A&Ox4. Neuros Q8 with L sided weakness and slight ataxia. VSS. Tele afib with CVR. Regular diet, thin liquids. Takes pills whole. Up with SBA gb. Denies pain. Pt scoring green on the Aggression Stop Light Tool. Referrals put out to TCU 11/26/20    Pt belongings from admission are present in room.

## 2020-11-27 NOTE — PLAN OF CARE
Speech Language Therapy Discharge Summary    Reason for therapy discharge:    All goals and outcomes met, no further needs identified.    Progress towards therapy goal(s). See goals on Care Plan in [facility] electronic health record for goal details.  Goals met    Therapy recommendation(s):    Patient is at baseline for swallowing tolerating regular diet. Recommend completion of cognitive/linguistic evaluation at next level of care.

## 2020-11-27 NOTE — PROGRESS NOTES
[facility]  Hospitalist Progress Note        Garrett Madsen MD   11/27/2020        Interval History:      -reports generalized weakness, otherwise no acute events overnight         Assessment and Plan:        Abbey Melgoza is a 75-year-old female with history of diabetes, hyperlipidemia, new onset atrial fibrillation who presented with acute ischemic stroke, most likely secondary to embolism because of her atrial fibrillation.       Acute CVA, possibly embolic   - CT head noted with evidence of small to acute infarct involving the posterior right insular cortex extending into the lateral right basal ganglia and right corona radiata.   CTA of the head shows acute  occlusion involving the right M2, M3 inferior division vessel.  CTA of the neck negative for any hemodynamically significant stenosis in the proximal internal carotid artery.    * MRI Brain: with areas of restricted diffusion in the right insula, left frontal lobe, and left occipital lobe with pattern consistent with an embolic process; also noted with likely petechial hemorrhage within the area of restricted diffusion in the right insula; no gross parenchymal hemorrhage is identified.  * ECHO with EF 60-65%, no RWMA, negative Bubble study, rhythm was atrial fibrillation  - on full dose ASA   - Neurology recommend waiting to start anticoagulation until day 7 post-stroke (11/29/20) with repeat CTH prior to initiation  - will plan to repeat head CT on 11/28 if stays in hospital; discussed with neurology  - PT/OT/Speech following, initially recommending ARU but now appears to have improved to the point of recommending TCU; tolerating regular diet      Atrial fibrillation with rapid ventricular response  Initially started on Cardizem drip but have been able to titrate off   - Lopressor titrated up to 100 mg BID; rate seems controlled in 80s, occasionaly with RVR in 120s--140s but not sustained  - will have Metoprolol IV prn for  HR>120s  - if sustained RVR, will consider adding PO Diltiazem  - plans for Eliquis 5 mg bid as above     Hyperlipidemia  - Continue Lipitor      DM type II   PTA on Metformin  - Holding the metformin  - SSI      DVT Prophylaxis: PCDs with plans for Eliquis as above  Code Status: No CPR- Do NOT Intubate               Disposition Plan     Expected discharge: medically stable for discharge when TCU available; SW working for disposition    Care plan discussed with patient and her daughter Radha over the phone (she lives in [place])                   Physical Exam:      Blood pressure 112/80, pulse 88, temperature 98.6  F (37  C), temperature source Oral, resp. rate 16, SpO2 97 %.  There were no vitals filed for this visit.  Vital Signs with Ranges  Temp:  [97.4  F (36.3  C)-98.6  F (37  C)] 98.6  F (37  C)  Pulse:  [] 88  Resp:  [16] 16  BP: (112-148)/() 112/80  SpO2:  [97 %-100 %] 97 %  I/O's Last 24 hours  I/O last 3 completed shifts:  In: 480 [P.O.:480]  Out: -     Constitutional: Alert, awake and oriented X 3; resting comfortably in no apparent distress   HEENT: Pupils equal and reactive to light and accomodation, neck supple; has a fading bruise on right side of chin    Oral cavity: Moist mucosa   Cardiovascular: Normal s1 s2, irregularly irregular rate and rhythm, no murmur   Lungs: B/l clear to auscultation, no wheezes or crepitations   Abdomen: Soft, nt, nd, no guarding, rigidity or rebound; BS +   LE : No edema   Musculoskeletal: Power 5/5 in all extremities   Neuro: No focal neurological deficits noted, CN II to XII grossly intact   Psychiatry: normal mood and affect                Medications:          aspirin  325 mg Oral Daily     atorvastatin  40 mg Oral or NG Tube Daily at 8 pm     insulin aspart  1-7 Units Subcutaneous TID AC     insulin aspart  1-5 Units Subcutaneous At Bedtime     metoprolol tartrate  100 mg Oral BID     PRN Meds: acetaminophen **OR** acetaminophen, glucose **OR**  dextrose **OR** glucagon, labetalol, - MEDICATION INSTRUCTIONS -         Data:      All new lab and imaging data was reviewed.   Recent Labs   Lab Test 11/22/20 2226   WBC 9.6   HGB 13.8   MCV 93      INR 1.14      Recent Labs   Lab Test 11/22/20 2226      POTASSIUM 4.1   CHLORIDE 107   CO2 22   BUN 18   CR 0.84   ANIONGAP 10   KAROLYN 9.2   *     Recent Labs   Lab Test 11/22/20 2226   TROPI <0.015

## 2020-11-27 NOTE — PROGRESS NOTES
Care Management Follow Up    Length of Stay (days): 4    Expected Discharge Date: 11/27/20(TCU)     Concerns to be Addressed: discharge planning     Patient plan of care discussed at interdisciplinary rounds: Yes    Anticipated Discharge Disposition:  TCU vs private duty nursing     Anticipated Discharge Services:    Anticipated Discharge DME:      Patient/family educated on Medicare website which has current facility and service quality ratings:    Education Provided on the Discharge Plan:    Patient/Family in Agreement with the Plan:      Referrals Placed by CM/SW:    Private pay costs discussed: Not applicable    Additional Information:  NASIM Freedman forwarded email exchange she has had with pt daughter.  Daughter is considering 2 additional TCU's and NASIM sent those rerferrals.  Daughter also considering 24 hour private duty nursing.  NASIM emailed daughter with updates.   Daughter called NASIM, daughter lives in [place] and [place] blocks internatinal calls.  Daughter states that she cannot leave her country, as they have closed their borders and people are not allowed to fly.  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  Daughter has spoken with pt daily, and pt is expressing that she is recognizing that she won't be able to go home safely as she is not able to do the things she needs to do.  Daughter would like for pt to go to ARU or TCU, but both pt and daughter are afraid of Covid exposure.  Daughter says that there are no Covid cases where she lives.  NASIM informed daughter that pt has BCBS insurance, and that we are hearing that [employer] is closed today which will mean we cannot get the prior authorization we will need.  Daughter does not know about pt finances and does not know whether pt can afford private duty nursing.  Daughters preference is TCU/ARU.  NASIM encouraged daughter to consider [facility] options for the long term, as pt is expressing to  daughter that she may not be able to return home.    SW can email pt daughter updates, and daughter can call SW back.  [email or web address]  ADDENDUM: [facility] may be able to accept pt, but we cannot get prior authorization until insurance company is open on Monday 11/30/20.  ADDENDUM: Chelle at [facility] will put pt in a slot for a bed on Monday 11/30/20.   Chelle will put pt name on the list for a bed, but presently [facility] has another Covid case and they may not be able to provide that bed for pt if there is a hold on admissions.       Chery Booth, LICSW

## 2020-11-28 ENCOUNTER — APPOINTMENT (OUTPATIENT)
Dept: OCCUPATIONAL THERAPY | Facility: CLINIC | Age: 75
DRG: 064 | End: 2020-11-28
Payer: COMMERCIAL

## 2020-11-28 ENCOUNTER — APPOINTMENT (OUTPATIENT)
Dept: CT IMAGING | Facility: CLINIC | Age: 75
DRG: 064 | End: 2020-11-28
Attending: HOSPITALIST
Payer: COMMERCIAL

## 2020-11-28 ENCOUNTER — APPOINTMENT (OUTPATIENT)
Dept: PHYSICAL THERAPY | Facility: CLINIC | Age: 75
DRG: 064 | End: 2020-11-28
Payer: COMMERCIAL

## 2020-11-28 LAB
GLUCOSE BLDC GLUCOMTR-MCNC: 156 MG/DL (ref 70–99)
GLUCOSE BLDC GLUCOMTR-MCNC: 96 MG/DL (ref 70–99)
GLUCOSE BLDC GLUCOMTR-MCNC: 98 MG/DL (ref 70–99)

## 2020-11-28 PROCEDURE — 97530 THERAPEUTIC ACTIVITIES: CPT | Mod: GP | Performed by: PHYSICAL THERAPIST

## 2020-11-28 PROCEDURE — 99232 SBSQ HOSP IP/OBS MODERATE 35: CPT | Performed by: HOSPITALIST

## 2020-11-28 PROCEDURE — 70450 CT HEAD/BRAIN W/O DYE: CPT

## 2020-11-28 PROCEDURE — 999N001017 HC STATISTIC GLUCOSE BY METER IP

## 2020-11-28 PROCEDURE — 250N000013 HC RX MED GY IP 250 OP 250 PS 637: Performed by: INTERNAL MEDICINE

## 2020-11-28 PROCEDURE — 97530 THERAPEUTIC ACTIVITIES: CPT | Mod: GO | Performed by: OCCUPATIONAL THERAPIST

## 2020-11-28 PROCEDURE — 97535 SELF CARE MNGMENT TRAINING: CPT | Mod: GO | Performed by: OCCUPATIONAL THERAPIST

## 2020-11-28 PROCEDURE — 97750 PHYSICAL PERFORMANCE TEST: CPT | Mod: GP | Performed by: PHYSICAL THERAPIST

## 2020-11-28 PROCEDURE — 120N000001 HC R&B MED SURG/OB

## 2020-11-28 PROCEDURE — 97112 NEUROMUSCULAR REEDUCATION: CPT | Mod: GP | Performed by: PHYSICAL THERAPIST

## 2020-11-28 RX ADMIN — ASPIRIN 325 MG: 325 TABLET, COATED ORAL at 08:03

## 2020-11-28 RX ADMIN — METOPROLOL TARTRATE 100 MG: 50 TABLET, FILM COATED ORAL at 21:32

## 2020-11-28 RX ADMIN — ATORVASTATIN CALCIUM 40 MG: 40 TABLET, FILM COATED ORAL at 19:51

## 2020-11-28 RX ADMIN — METOPROLOL TARTRATE 100 MG: 50 TABLET, FILM COATED ORAL at 08:03

## 2020-11-28 ASSESSMENT — ACTIVITIES OF DAILY LIVING (ADL)
ADLS_ACUITY_SCORE: 14

## 2020-11-28 NOTE — PROGRESS NOTES
Nursing shift note  0700-2300Pt here with right CVA. A&O X4. Neuros intact with some left sided weakness. VSS. Tele A-fib RVR max 138 briefly- difficult to manage throughout shifts. MD paged and PRN metoprolol was ordered and given X2- will continue to monitor. Regular diet, thi liquids. Takes pills whole with water. Up with SBA GB. Denies pain. Bruise on right side of chin from fall at home. Plan for CT tomorrow 11/28 and then to start Eliquis 11/29. Plan for discharge to TCU possibly tomorrow.     Behavior & Aggression Tool color: green      Pt's belongings:   (Belongings from admission day present in pt's room)              Cell phone, , clothing, shoes and large pink bag    Home medications: N

## 2020-11-28 NOTE — PROGRESS NOTES
"BRIEF NUTRITION ASSESSMENT    REASON FOR ASSESSMENT:  Abbey Melgoza is a 75 year old female seen by Registered Dietitian for [facility]    NUTRITION HISTORY:  - Info obtained from patient.   - follows a healthy diet at home. Tends to have a smoothie w/ berries, full-fat greek yogurt, and spinach for breakfast. Adds oatmeal.   - Likes whole grain bread/crackers w/ natural PB  - Eats chicken and spinach salads often (though notes that they haven't been tasting as good lately).       CURRENT DIET AND INTAKE:  Diet: Regular        Eating % of meals. \"ordering things I would never allow myself at home!\" (Ecu[place]orean toast, pancakes, apple pie).     ANTHROPOMETRICS:  Height: Data Unavailable  Weight:  0 lbs 0 oz  There is no height or weight on file to calculate BMI.   Weight History:   Wt Readings from Last 10 Encounters:   No data found for Wt       LABS/MEDS:  Reviewed       MALNUTRITION:  Visual Nutrition Focused Physical Assessment (NFPA) not completed due to restrictions on face-to-face patient care during COVID-19 Pandemic. Do not suspect muscle/fat losses.  Patient does not meet two of the following criteria necessary for diagnosing malnutrition.     % Weight Loss:  None reported   % Intake:  No decreased intake noted  Subcutaneous Fat Loss:  Age related   Muscle Loss:  Age related  Fluid Retention:  None noted    NUTRITION INTERVENTION:  Nutrition Diagnosis:  No nutrition diagnosis at this time.    Implementation:  Nutrition Education:     Assessed learning needs, learning preferences, and willingness to learn    Nutrition Education (Content):  a) Discussed   a. Healthy snacks  b. Tips for adding protein     Patient verbalizes understanding of diet     Anticipate good compliance    Diet Education - refer to Education Flowsheet      FOLLOW UP/MONITORING:   Will re-evaluate in 7 - 10 days, or sooner, if re-consulted.    Sara Espinal RD,   [facility] [facility]   Pager: [phone]  Weekend Pager: " [phone]

## 2020-11-28 NOTE — PLAN OF CARE
Nursing shift note  Pt here with L. CVA. A&Ox4. Neuros intact other than generalized weakness. Other than HTN, VSS on RA. PRN metoprolol available for HR >120. Tele A. Fib with CVR. Regular diet. Takes pills whole with water. Up with SBA using GB. Denies pain. Currently saline locked. Pt scoring green on the Aggression Stop Light Tool. Plan for CT tomorrow before discharge, Eliquis will be initiated on 11/29. Discharge to TCU pending, possibly on 11/28.    Behavior & Aggression Tool color:  Green    Pt's belongings:   (Belongings from admission day present in pt's room)

## 2020-11-28 NOTE — PLAN OF CARE
Nursing shift note  Pt here with RONNIE JAMES. A&O. Neuros intact ex. For generalized weakness. VSS on RA. Tele A-fib with RVR. Regular diet, thin liquids. Takes pills whole. Up with standby assist. Denies pain. Pt scoring green on the Aggression Stop Light Tool. Plan to discharge to TCU when bed available.      Behavior & Aggression Tool color:  Green    Pt's belongings:   cell phone/electronics, clothing, and shoes      Home medications: no

## 2020-11-28 NOTE — PROGRESS NOTES
[facility]  Hospitalist Progress Note        Garrett Madsen MD   11/28/2020        Interval History:      - got a follow up head CT today  - no acute issues overnight part from intermittent non-sustained a fib/ rvr         Assessment and Plan:        Abbey Melgoza is a 75-year-old female with history of diabetes, hyperlipidemia, new onset atrial fibrillation who presented with acute ischemic stroke, most likely secondary to embolism because of her atrial fibrillation.      - CT head noted with evidence of small to acute infarct involving the posterior right insular cortex extending into the lateral right basal ganglia and right corona radiata.   CTA of the head shows acute  occlusion involving the right M2, M3 inferior division vessel.  CTA of the neck negative for any hemodynamically significant stenosis in the proximal internal carotid artery.    * MRI Brain: with areas of restricted diffusion in the right insula, left frontal lobe, and left occipital lobe with pattern consistent with an embolic process; also noted with likely petechial hemorrhage within the area of restricted diffusion in the right insula; no gross parenchymal hemorrhage is identified.  * ECHO with EF 60-65%, no RWMA, negative Bubble study, rhythm was atrial fibrillation     Acute CVA, possibly embolic   - CT, CTA, MRI brain and ECHO as noted above  - on full dose ASA   - Neurology recommend waiting to start anticoagulation until day 7 post-stroke (11/29/20) with repeat CTH prior to initiation  - repeat head CT 11/28 noted with evolution of infarct involving the right MCA distribution with areas of hyperattenuation suggestive of petechial hemorrhage  - will have neurology review CT and suggest if okay to resume Eliquis  - PT/OT/Speech following, initially recommending ARU but now appears to have improved to the point of recommending TCU; tolerating regular diet      Atrial fibrillation with rapid ventricular  response  Initially started on Cardizem drip but have been able to titrate off   - Lopressor titrated up to 100 mg BID; rate seems controlled in 80s, occasionaly with RVR but not sustained  - Metoprolol IV prn for HR>120s  - if sustained RVR, will consider adding PO Diltiazem  - plans for Eliquis 5 mg bid as above if cleared by neurology     Hyperlipidemia  - Continue Lipitor      DM type II   PTA on Metformin  - Holding the metformin  - SSI      DVT Prophylaxis: PCDs with plans for Eliquis as above  Code Status: No CPR- Do NOT Intubate      Disposition Plan     Expected discharge: medically stable for discharge when TCU available; SW working for disposition-- might have a bed at [place] on Monday 11/30    Care plan discussed with patient and I had also updated daughter in [place] on 11/27                     Physical Exam:      Blood pressure 132/68, pulse 98, temperature 98.2  F (36.8  C), temperature source Oral, resp. rate 18, SpO2 99 %.  There were no vitals filed for this visit.  Vital Signs with Ranges  Temp:  [97.2  F (36.2  C)-98.2  F (36.8  C)] 98.2  F (36.8  C)  Pulse:  [] 98  Resp:  [15-18] 18  BP: (103-141)/(68-91) 132/68  SpO2:  [97 %-100 %] 99 %  I/O's Last 24 hours  I/O last 3 completed shifts:  In: 480 [P.O.:480]  Out: -     Constitutional: Alert, awake and oriented X 3; resting comfortably in no apparent distress   HEENT: Pupils equal and reactive to light and accomodation, neck supple; has a fading bruise on right side of chin    Oral cavity: Moist mucosa   Cardiovascular: Normal s1 s2, irregularly irregular rate and rhythm, no murmur   Lungs: B/l clear to auscultation, no wheezes or crepitations   Abdomen: Soft, nt, nd, no guarding, rigidity or rebound; BS +   LE : No edema   Musculoskeletal: Power 5/5 in all extremities   Neuro: No focal neurological deficits noted, CN II to XII grossly intact   Psychiatry: normal mood and affect                Medications:          aspirin  325 mg Oral  Daily     atorvastatin  40 mg Oral or NG Tube Daily at 8 pm     insulin aspart  1-7 Units Subcutaneous TID AC     insulin aspart  1-5 Units Subcutaneous At Bedtime     metoprolol tartrate  100 mg Oral BID     PRN Meds: acetaminophen **OR** acetaminophen, glucose **OR** dextrose **OR** glucagon, labetalol, - MEDICATION INSTRUCTIONS -, metoprolol         Data:      All new lab and imaging data was reviewed.   Recent Labs   Lab Test 11/22/20 2226   WBC 9.6   HGB 13.8   MCV 93      INR 1.14      Recent Labs   Lab Test 11/27/20  1157 11/22/20 2226   NA  --  139   POTASSIUM  --  4.1   CHLORIDE  --  107   CO2  --  22   BUN  --  18   CR 0.97 0.84   ANIONGAP  --  10   KAROLYN  --  9.2   GLC  --  147*     Recent Labs   Lab Test 11/22/20 2226   TROPI <0.015

## 2020-11-29 ENCOUNTER — APPOINTMENT (OUTPATIENT)
Dept: OCCUPATIONAL THERAPY | Facility: CLINIC | Age: 75
DRG: 064 | End: 2020-11-29
Payer: COMMERCIAL

## 2020-11-29 ENCOUNTER — APPOINTMENT (OUTPATIENT)
Dept: PHYSICAL THERAPY | Facility: CLINIC | Age: 75
DRG: 064 | End: 2020-11-29
Payer: COMMERCIAL

## 2020-11-29 LAB
GLUCOSE BLDC GLUCOMTR-MCNC: 112 MG/DL (ref 70–99)
GLUCOSE BLDC GLUCOMTR-MCNC: 137 MG/DL (ref 70–99)
GLUCOSE BLDC GLUCOMTR-MCNC: 139 MG/DL (ref 70–99)
GLUCOSE BLDC GLUCOMTR-MCNC: 166 MG/DL (ref 70–99)

## 2020-11-29 PROCEDURE — 97112 NEUROMUSCULAR REEDUCATION: CPT | Mod: GP

## 2020-11-29 PROCEDURE — 250N000013 HC RX MED GY IP 250 OP 250 PS 637: Performed by: HOSPITALIST

## 2020-11-29 PROCEDURE — 120N000001 HC R&B MED SURG/OB

## 2020-11-29 PROCEDURE — 250N000013 HC RX MED GY IP 250 OP 250 PS 637: Performed by: INTERNAL MEDICINE

## 2020-11-29 PROCEDURE — 99233 SBSQ HOSP IP/OBS HIGH 50: CPT | Performed by: HOSPITALIST

## 2020-11-29 PROCEDURE — 97535 SELF CARE MNGMENT TRAINING: CPT | Mod: GO

## 2020-11-29 PROCEDURE — 97116 GAIT TRAINING THERAPY: CPT | Mod: GP

## 2020-11-29 PROCEDURE — 999N001017 HC STATISTIC GLUCOSE BY METER IP

## 2020-11-29 PROCEDURE — 97110 THERAPEUTIC EXERCISES: CPT | Mod: GP

## 2020-11-29 PROCEDURE — 97530 THERAPEUTIC ACTIVITIES: CPT | Mod: GP

## 2020-11-29 RX ORDER — DILTIAZEM HYDROCHLORIDE 30 MG/1
30 TABLET, FILM COATED ORAL EVERY 6 HOURS SCHEDULED
Status: DISCONTINUED | OUTPATIENT
Start: 2020-11-29 | End: 2020-11-30 | Stop reason: HOSPADM

## 2020-11-29 RX ORDER — DILTIAZEM HYDROCHLORIDE 30 MG/1
30 TABLET, FILM COATED ORAL EVERY 6 HOURS SCHEDULED
Status: DISCONTINUED | OUTPATIENT
Start: 2020-11-29 | End: 2020-11-29

## 2020-11-29 RX ADMIN — DILTIAZEM HYDROCHLORIDE 30 MG: 30 TABLET, FILM COATED ORAL at 18:38

## 2020-11-29 RX ADMIN — METOPROLOL TARTRATE 100 MG: 50 TABLET, FILM COATED ORAL at 08:49

## 2020-11-29 RX ADMIN — ATORVASTATIN CALCIUM 40 MG: 40 TABLET, FILM COATED ORAL at 20:12

## 2020-11-29 RX ADMIN — METOPROLOL TARTRATE 100 MG: 50 TABLET, FILM COATED ORAL at 20:12

## 2020-11-29 RX ADMIN — DILTIAZEM HYDROCHLORIDE 30 MG: 30 TABLET, FILM COATED ORAL at 23:38

## 2020-11-29 RX ADMIN — ASPIRIN 325 MG: 325 TABLET, COATED ORAL at 08:49

## 2020-11-29 RX ADMIN — DILTIAZEM HYDROCHLORIDE 30 MG: 30 TABLET, FILM COATED ORAL at 10:34

## 2020-11-29 ASSESSMENT — ACTIVITIES OF DAILY LIVING (ADL)
ADLS_ACUITY_SCORE: 14

## 2020-11-29 NOTE — PROGRESS NOTES
"Brief follow up note:   Abbey Melgoza is a 75 year old female with PMH of HLD and type 2 DM, who presented to the emergency department via EMS after she was found down in her home with left sided weakness. Patient was found to have new onset of atrial fibrillation with RVR     Stroke Evaluation summarized:  MRI/Head CT: NCCT per HPI, MRI brain with areas of restricted diffusion in the right insula, left frontal lobe, and left occipital lobe  Intracranial Vascular Imaging: CTA head with R M2/M3 inferior division junction occlusion  Cervical Carotid and Vertebral Artery Vascular Imaging: mild atherosclerotic plaque in the right carotid bifurcation, atherosclerotic plaque with less than 50% stenosis in the left ICA   Echocardiogram: EF 60-65%, mild LAD, negative bubble  EKG/Telemetry: afib with RVR  LDL: 36  A1c: pending   Troponin: <0.015  Other testing: Not Applicable     Impression:  Ischemic Stroke due to cardioembolism in the setting of new onset atrial fibrillation.     Plan:  - Repeat head CT revealed evolving RCA infarct with hyperattenuation suggestive of petechial hemorrhage. Given small amount of petechial hemorrhagic transformation in right MCA territory infarct, recommend waiting to start anticoagulation until day 14 post-stroke rather than day 10. (12/06/2020) with repeat CTH prior to initiation    Thank you for this consult. Please contact us with any additional questions.     Roseanne Jane PA-C  Neurology   11/29/2020 at 1:58 PM  To page me or covering stroke neurology team member, click here: [facility]COM  Choose \"On Call\" tab at top, then search dropdown box for \"Neurology Adult\" & press Enter, look for Neuro ICU/Stroke    "

## 2020-11-29 NOTE — PLAN OF CARE
DATE & TIME: 11.28.20 1900-0700    Cognitive Concerns/ Orientation : AO4   BEHAVIOR & AGGRESSION TOOL COLOR: Green  ABNL VS/O2: VSS on RA  MOBILITY: SBA GB  PAIN MANAGMENT: Denies  DIET: Regular  BOWEL/BLADDER: Continent  ABNL LAB/BG:   DRAIN/DEVICES: PIV SL  TELEMETRY RHYTHM: A-Fib + PACs slight tachy.  SKIN: Some bruising and scab on left lateral ankle.  TESTS/PROCEDURES: NA  D/C DAY/GOALS/PLACE: Medically stable for discharge when TCU available; SW working for disposition-- might have a bed at [facility] on Monday 11/30  OTHER IMPORTANT INFO: Labetalol nat. For BP< 220/120. Metoprolol available for HR>120

## 2020-11-29 NOTE — PROGRESS NOTES
"SPIRITUAL HEALTH SERVICES Progress Note  [facility] 72    Visited pt due to length of stay. I introduced myself and  services and pt invited me to sit with her. 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 I affirmed her feelings she expressed and pt reported that \"so many have it worse than I do\". Pt also told of her relationships with her daughters and the challenges she faces in remaining in communication with one of them. Pt reports the support of friends who she is contact with, as well. I offered words of our care pt.     [facility] remains available.      Brinda Bridges  Chaplain Resident    "

## 2020-11-29 NOTE — PLAN OF CARE
[id number]. No changes this shift. A+OX4. Neuros intact ex generalized weakness. VSS on RA ex high HR. Heart rate not at parameters for metoprolol. Possible discharge to TCU pending bed availability.

## 2020-11-30 ENCOUNTER — APPOINTMENT (OUTPATIENT)
Dept: OCCUPATIONAL THERAPY | Facility: CLINIC | Age: 75
DRG: 064 | End: 2020-11-30
Payer: COMMERCIAL

## 2020-11-30 VITALS
TEMPERATURE: 97.8 F | OXYGEN SATURATION: 100 % | HEART RATE: 82 BPM | RESPIRATION RATE: 14 BRPM | DIASTOLIC BLOOD PRESSURE: 61 MMHG | SYSTOLIC BLOOD PRESSURE: 119 MMHG

## 2020-11-30 LAB
CREAT SERPL-MCNC: 1.08 MG/DL (ref 0.52–1.04)
GFR SERPL CREATININE-BSD FRML MDRD: 50 ML/MIN/{1.73_M2}
GLUCOSE BLDC GLUCOMTR-MCNC: 155 MG/DL (ref 70–99)
GLUCOSE BLDC GLUCOMTR-MCNC: 96 MG/DL (ref 70–99)

## 2020-11-30 PROCEDURE — 250N000013 HC RX MED GY IP 250 OP 250 PS 637: Performed by: INTERNAL MEDICINE

## 2020-11-30 PROCEDURE — 82565 ASSAY OF CREATININE: CPT | Performed by: INTERNAL MEDICINE

## 2020-11-30 PROCEDURE — 250N000013 HC RX MED GY IP 250 OP 250 PS 637: Performed by: HOSPITALIST

## 2020-11-30 PROCEDURE — 999N001017 HC STATISTIC GLUCOSE BY METER IP

## 2020-11-30 PROCEDURE — 36415 COLL VENOUS BLD VENIPUNCTURE: CPT | Performed by: INTERNAL MEDICINE

## 2020-11-30 PROCEDURE — 99239 HOSP IP/OBS DSCHRG MGMT >30: CPT | Performed by: HOSPITALIST

## 2020-11-30 PROCEDURE — 97535 SELF CARE MNGMENT TRAINING: CPT | Mod: GO

## 2020-11-30 PROCEDURE — 97530 THERAPEUTIC ACTIVITIES: CPT | Mod: GO

## 2020-11-30 RX ORDER — ATORVASTATIN CALCIUM 40 MG/1
40 TABLET, FILM COATED ORAL DAILY
DISCHARGE
Start: 2020-11-30 | End: 2020-12-10

## 2020-11-30 RX ORDER — DILTIAZEM HYDROCHLORIDE 120 MG/1
120 CAPSULE, EXTENDED RELEASE ORAL DAILY
DISCHARGE
Start: 2020-11-30 | End: 2020-12-10

## 2020-11-30 RX ORDER — METOPROLOL TARTRATE 100 MG
100 TABLET ORAL 2 TIMES DAILY
DISCHARGE
Start: 2020-11-30 | End: 2020-12-10

## 2020-11-30 RX ORDER — ASPIRIN 325 MG
325 TABLET, DELAYED RELEASE (ENTERIC COATED) ORAL DAILY
DISCHARGE
Start: 2020-12-01 | End: 2020-12-09 | Stop reason: ALTCHOICE

## 2020-11-30 RX ADMIN — METOPROLOL TARTRATE 100 MG: 50 TABLET, FILM COATED ORAL at 08:32

## 2020-11-30 RX ADMIN — ASPIRIN 325 MG: 325 TABLET, COATED ORAL at 08:32

## 2020-11-30 RX ADMIN — DILTIAZEM HYDROCHLORIDE 30 MG: 30 TABLET, FILM COATED ORAL at 06:21

## 2020-11-30 RX ADMIN — DILTIAZEM HYDROCHLORIDE 30 MG: 30 TABLET, FILM COATED ORAL at 12:45

## 2020-11-30 ASSESSMENT — ACTIVITIES OF DAILY LIVING (ADL)
ADLS_ACUITY_SCORE: 14

## 2020-11-30 NOTE — PLAN OF CARE
Occupational Therapy Discharge Summary    Reason for therapy discharge:    Discharged to transitional care facility.    Progress towards therapy goal(s). See goals on Care Plan in [facility] electronic health record for goal details.  Goals not met.  Barriers to achieving goals:   discharge from facility.    Therapy recommendation(s):    Continued therapy is recommended.  Rationale/Recommendations:  at TCU to improve ind/safety w/ ADL's and IADL's.

## 2020-11-30 NOTE — PLAN OF CARE
Nursing shift note  Pt here with R MCA. A&Ox4. Neuros intact. VSS. Tele Afib CVR. Regular diet, thin liquids. Takes pills whole. Up with A1 w/ GB and walker. Denies pain. Pt scoring green on the Aggression Stop Light Tool. Plan to continue work w/ therapies. Discharge to TCU at 1400.  Pt's belongings:     Cell phone, clothing, and shoes remain with pt on discharge.    Home medications:   None.

## 2020-11-30 NOTE — PROGRESS NOTES
Care Management Discharge Note    Discharge Date: 11/30/20  Expected Time of Departure: 2pm(2pm)    Discharge Disposition: Transitional Care    Discharge Services:  TCU    Discharge DME:      Discharge Transportation: other (see comments)    Private pay costs discussed: Possible transport costs, applicable. Dtr aware and agrees.     PAS Confirmation Code: [id number]  Patient/family educated on Medicare website which has current facility and service quality ratings: yes    Education Provided on the Discharge Plan:  Yes  Persons Notified of Discharge Plans: Yes  Patient/Family in Agreement with the Plan: yes    Handoff Referral Completed: Yes    Additional Information:    Orders and PAS were faxed to facility. No further SW interventions anticipated at this time.   PAS-RR    D: Per DHS regulation, SW completed and submitted PAS-RR to [place] [employer] Direct Connect via the Senior LinkAge Line.  PAS-RR confirmation # is : [id number]    P: Further questions may be directed to Senior LinkAge Line at #[phone], option #4 for PAS-RR staff.        GABRIEL Lomas   [facility]  [phone]

## 2020-11-30 NOTE — PROVIDER NOTIFICATION
11/29/20 1013   Signing Clinician's Name / Credentials   Signing clinician's name / credentials Louisa Brand PTA   Quick Adds   Rehab Discipline PT   PT Assistant Visit Number 1   Gait Training   Minutes of Treatment (Gait Training) 9   Symptoms Noted During/After Treatment (Gait Training) fatigue   Distance in Feet (Required for LE Total Joints) 350   Treatment Detail Gait training with no AD and CGA. no overt LOB. Reviewed safe stair mobility. Pt went up/down 3 stairs x 1 using single rail and CGA for safety. Cues for pacing needed.    Therapeutic Activity   Minutes of Treatment 2 minutes   Symptoms Noted During/After Treatment None   Treatment Detail Pt sitting up in chair upon arrival; agreeable to therapy. Performed STS transfer with SBA.    Therapeutic Exercise   Minutes of Treatment 9   Symptoms Noted During/After Treatment fatigue   Treatment Detail Pt performed the following standing strengthening exercises: heel raises, marches, forward kicks, hip abd, mini squats x 12 bilaterally. Required SBA - CGA and intermittent single UE support. Mild LOB which pt was able to recover independently from.    Neuromuscular Re-Education   Minutes of Treatment 8   Symptoms Noted During/After Treatment fatigue   Treatment Detail Pt performed the following dynamic gait activities: head turns, side stepping, retro, braiding, and tandem. Pt required CGA-Min A and demonstrated difficulty with braiding and tandem requiring demonstration and repeated cues for safety and technique.    PT Discharge Planning    PT Discharge Recommendation (DC Rec) Transitional Care Facility   PT Rationale for DC Rec Pt was declined from ARU but is planning on TCU. Pt does not feel safe alone at home.    Additional Documentation   PT Plan higher balance tasks, dynamic gait   Total Session Time   Total Session Time (minutes) 28 minutes

## 2020-11-30 NOTE — PROVIDER NOTIFICATION
11/29/20 1518   Signing Clinician's Name / Credentials   Signing clinician's name / credentials Louisa Brand PTA   Quick Adds   Rehab Discipline PT   PT Assistant Visit Number 2   Gait Training   Minutes of Treatment (Gait Training) 10   Symptoms Noted During/After Treatment (Gait Training) fatigue   Distance in Feet (Required for LE Total Joints) 500   Treatment Detail Gait training with no AD and CGA progressing to SBA.  Incorporated head turns with gait. Mild unsteadiness noted with this.   Therapeutic Activity   Minutes of Treatment 10 minutes   Symptoms Noted During/After Treatment Fatigue   Treatment Detail Pt sitting up in chair upon arrival; requesting to use bathroom. STS transfer completed with SBA. Ambulated 15 ft to bathroom with SBA. Toilet transfer completed with SBA. SBA and increased time for changing of brief. SBA for hand hygiene. Transferred back to chair at end of session.    Neuromuscular Re-Education   Minutes of Treatment 10   Symptoms Noted During/After Treatment fatigue   Treatment Detail Pt performed the following dynamic gait/balance activities: weaving around obstacles x 6, normal speed/increased speed, stepping over cones, braiding, toe taps. minor disturbances throughout which pt was able to recover from independently. Pt then participated in static balance on foam with regular SHU and NBOS with EO/EC x 30 sec each with CGA.   Additional Documentation   PT Plan higher balance tasks, dynamic gait   Total Session Time   Total Session Time (minutes) 30 minutes

## 2020-11-30 NOTE — PLAN OF CARE
DATE & TIME: 11.29.20 1900-230000                          Cognitive Concerns/ Orientation : AO4   BEHAVIOR & AGGRESSION TOOL COLOR: Green  ABNL VS/O2: VSS on RA  MOBILITY: SBA GB  PAIN MANAGMENT: Denies  DIET: Regular  BOWEL/BLADDER: Continent  ABNL LAB/BG:   DRAIN/DEVICES: PIV SL  TELEMETRY RHYTHM: A-fib CVR  SKIN: Some bruising and scab on left lateral ankle.  TESTS/PROCEDURES: NA  D/C DAY/GOALS/PLACE: Medically stable for discharge when TCU available; SW working for disposition-- might have a bed at [facility] on Monday 11/30  OTHER IMPORTANT INFO: Labetalol nat. For BP< 220/120. Metoprolol available for HR>120.

## 2020-11-30 NOTE — PLAN OF CARE
Nursing shift note  Pt here with CVA. A&Ox4. Neuros intact. VSS ex for intermittent elevated HR. Tele A-fib with CVR. Regular diet, thin liquids. Takes pills whole. Up with STBY. Denies pain. Pt scoring green on the Aggression Stop Light Tool. Plan to discharge to [facility] at 11:00am tomorrow morning.     Behavior & Aggression Tool color:  green    Pt's belongings:   Remain with patient      Home medications: none

## 2020-11-30 NOTE — PROGRESS NOTES
Patient seen and examined    - no acute issues overnight  - rate controlled on PO metoprolol and diltiazem  - CT head reviewed with neuro-- plan to repeat head CT on 12/6 before deciding on Eliquis    Discharge to [facility] TCU today    Care plan discussed with patient and her daughter Radha over the phone    Please see discharge summary for details

## 2020-11-30 NOTE — PROVIDER NOTIFICATION
11/28/20 1610   Signing Clinician's Name / Credentials   Signing clinician's name / credentials Selam Zarate, PT, DPT   Quick Adds   Rehab Discipline PT   Quick Adds Physical Performance Test or Measurement   Therapeutic Activity   Minutes of Treatment 3 minutes   Treatment Detail PT: Pt was agreeable to visit, SBA sit<>stand. Discussed pts discharge plans and answered rehab related questions in brief. Pt up in chair with alarm activated at visit end.    Therapeutic Exercise   Minutes of Treatment 11   Symptoms Noted During/After Treatment fatigue   Treatment Detail PT: Focused on gait for endurance benefits as pt reported feeling very inactive and deconditioned. Pt ambulated x ~ 10 min with CGA-SBA, had one R path deviation towards wall requiring GILMA. Cued for correction also. Encouraged pt to work on basic seated ex while up intermittently to improve her activity level. Pt able to briefly demonstrate proper technique with marching, hip abd/add and LAQs x at least 2 reps B. Plan to have pt work on further IND.   (HR up to 116 bpm)   Physical Performance Test or Measurement   Minutes of Treatment 17   Symptoms Noted During/After Treatment fatigue   Treatment Detail PT: FGA performed, pt scored 22/30.    PT Discharge Planning    PT Discharge Recommendation (DC Rec) Transitional Care Facility   PT Rationale for DC Rec Pt was declined from ARU but is planning on TCU. Pt does not feel safe alone at home. Pt still has baseline tachycardia and with activity. Pt improving on formal balance assessment re-test, but still considered at increased falling risk.    PT Brief overview of current status  Ax1 for safe gait. Advise regular assisted ambulation at least 3x/day.    Additional Documentation   PT Plan PT: continue higher level balance tasks; see FGA for items to work on additionally. Endurance tasks will also continue to benefit pt.    Total Session Time   Total Session Time (minutes) 31 minutes

## 2020-11-30 NOTE — PROVIDER NOTIFICATION
11/28/20 1349   Signing Clinician's Name / Credentials   Signing clinician's name / credentials Selam Garcia OTR/L   Quick Adds   Rehab Discipline OT   Therapeutic Activity   Minutes of Treatment 10 minutes   Symptoms Noted During/After Treatment None   Treatment Detail OT: sit <>stand and ambulate to BR and back and in room to retrieve items in closet higher and lower with SBA, no LOB noted. pt ambulated approx 100 ft in prep for household distances. No SOB noted.    ADL Training   Minutes of Treatment 12   Treatment Detail OT: Pt able to doff/darline socks independently, stood at sink to comb hair using B UE's without diffculty with S. pt able to stand and fold a blanket with S/SBA and no LOB or fatigue.    OT Discharge Planning    OT Discharge Recommendation (DC Rec) Acute Rehab Center-Motivated patient will benefit from intensive, interdisciplinary therapy.  Anticipate will be able to tolerate 3 hours of therapy per day   OT Rationale for DC Rec Pt making good progress toward goals. If pt meets criteria for ARU pt would benefit from continued skilled therapy to maximize safety and independence with ADLs. If pt does not have ARU needs, recommend TCU for likely short stay prior to returning home.    Additional Documentation   OT Plan OT plan; have pt make her bed or provide with 3 ADL tasks to do and see if able to recall and complete all tasks without cues for each task.   Total Session Time   Total Session Time (minutes) 22 minutes

## 2020-11-30 NOTE — PROGRESS NOTES
Summary: Acute Ischemic Stroke 2nd to new onset Afib  Date/Time: 4:10 AM November 30, 2020    Cognitive Concerns/Orientation: A&Ox4  ABNL VS/O2: RA  Cardiac:?WDL  Resp:?LS clear  GI:?WDL  Mobility: SBB  Pain Management: denies  Diet: Reg  Bowel/Bladder: continent, BR  ABNL Labs/BG:    Drains/Devices:?PIV  Telemetry Rhythm:  Afib CVR  Skin: bruised  CMS: intact  Test Procedures: CT showed acute occlusion, EF 60-65%  Discharge day/Goals/Place: TCU [facility] pending bed availability  Other Important Info: restart eliquis in 2 weeks  History:?History reviewed. No pertinent past medical history.    History reviewed. No pertinent surgical history.    History reviewed. No pertinent family history.    Social History     Tobacco Use     Smoking status: Not on file   Substance Use Topics     Alcohol use: Not on file

## 2020-11-30 NOTE — PROGRESS NOTES
Care Management Follow Up    Length of Stay (days): 7    Expected Discharge Date: 11/30/20(TCU)     Concerns to be Addressed: discharge planning     Patient plan of care discussed at interdisciplinary rounds: Yes    Anticipated Discharge Disposition:       Anticipated Discharge Services:    Anticipated Discharge DME:      Patient/family educated on Medicare website which has current facility and service quality ratings:    Education Provided on the Discharge Plan:    Patient/Family in Agreement with the Plan:      Referrals Placed by CM/SW:    Private pay costs discussed: Not applicable    Additional Information:  [facility] has accepted pt, however they report having COVID + patients on their TCU. [facility] confirms at this time they have a group of staff that only work w/these patients.  NASIM met w/patient who states she will go to [facility], knowing she will be isolated for the first 14 days (receiving therapy within her room). [facility] still has to reach out to [facility] MCR advantage for auth.  [facility] reports no beds today.       GABRIEL Lomas   [facility]  [phone]    UPDATE@3090: [facility] requests additional PT/OT notes for [facility] which were faxed to: [phone]    UPDATE@6487: Pt accepted at [facility]; transport aide will take pt at 2pm. Awaiting orders.

## 2020-11-30 NOTE — DISCHARGE SUMMARY
[facility]  Discharge Summary        Abbey Melgoza MRN# [id number]   YOB: 1945 Age: 75 year old     Date of Admission:  11/22/2020  Date of Discharge:  11/30/2020  Admitting Physician:  Ambrose Velázquez MD  Discharge Physician: Garrett Madsen MD  Discharging Service: Hospitalist     Primary Provider: No Ref-Primary, Physician  Primary Care Physician Phone Number: None         Discharge Diagnoses/Problem Oriented Hospital Course (Providers):    Abbey Melgoza was admitted on 11/22/2020 by Ambrose Velázquez MD and I would refer you to their history and physical.  The following problems were addressed during her hospitalization:    Abbey Melgoza is a 75-year-old female with history of diabetes, hyperlipidemia, new onset atrial fibrillation who presented with acute ischemic stroke, most likely secondary to embolism because of her atrial fibrillation.       - CT head noted with evidence of small to acute infarct involving the posterior right insular cortex extending into the lateral right basal ganglia and right corona radiata.   CTA of the head shows acute  occlusion involving the right M2, M3 inferior division vessel.  CTA of the neck negative for any hemodynamically significant stenosis in the proximal internal carotid artery.    * MRI Brain: with areas of restricted diffusion in the right insula, left frontal lobe, and left occipital lobe with pattern consistent with an embolic process; also noted with likely petechial hemorrhage within the area of restricted diffusion in the right insula; no gross parenchymal hemorrhage is identified.  * ECHO with EF 60-65%, no RWMA, negative Bubble study, rhythm was atrial fibrillation     Acute CVA, possibly embolic   - CT, CTA, MRI brain and ECHO as noted above  - started on full dose ASA   - repeat head CT 11/28 noted with evolution of infarct involving the right MCA distribution with areas of hyperattenuation suggestive of  petechial hemorrhage  - 11/29, discussed with stroke neurology on call who reviewed the CT and recommends repeat another CT in 1 week (on 12/06/20) and okay to start Eliquis if no further worsening of the petechial hemorrhage.  - PT/OT/Speech following, initially recommending ARU but now appears to have improved to the point of recommending TCU; tolerating regular diet         Atrial fibrillation with rapid ventricular response  Initially started on Cardizem drip but have been able to titrate off   - Lopressor titrated up to 100 mg BID; rate seems controlled in 80s but had been having intermittent RVRs with HR in 120s--140s  - 11/29 added Diltiazem 30 mg po q 6 hrs; rate now better controlled--will switch to PO Dilt extended release 120 mg daily  - plans for Eliquis 5 mg bid as above pending repeat head CT 12/05/20     Hyperlipidemia  - Continue Lipitor      DM type II   - resumed PTA on Metformin    Code Status: No CPR- Do NOT Intubate           Pending Results:        Unresulted Labs Ordered in the Past 30 Days of this Admission     No orders found from 10/23/2020 to 11/23/2020.               Discharge Instructions and Follow-Up:      Follow-up Appointments     Follow Up and recommended labs and tests      Follow up with senior living physician.  The following labs/tests are   recommended: repeat Head CT on 12/06/20 and to start Eliquis 5 mg po bid   if head scan shows stable to improved petechial hemorrhage.    Follow up with stroke neurology in 6-8 weeks as suggested.                  Discharge Disposition:      Discharged to short-term care facility         Discharge Medications:        Current Discharge Medication List      START taking these medications    Details   aspirin (ASA) 325 MG EC tablet Take 1 tablet (325 mg) by mouth daily  Qty:      Associated Diagnoses: Cerebrovascular accident (CVA), unspecified mechanism (H)      atorvastatin (LIPITOR) 40 MG tablet 1 tablet (40 mg) by Oral or NG Tube route daily     Associated Diagnoses: Cerebrovascular accident (CVA), unspecified mechanism (H)      diltiazem ER (TIAZAC) 120 MG 24 hr ER beaded capsule Take 1 capsule (120 mg) by mouth daily  Qty:      Associated Diagnoses: Atrial fibrillation with RVR (H)      metoprolol tartrate (LOPRESSOR) 100 MG tablet Take 1 tablet (100 mg) by mouth 2 times daily  Qty:      Associated Diagnoses: Atrial fibrillation with RVR (H)         CONTINUE these medications which have NOT CHANGED    Details   metFORMIN (GLUCOPHAGE) 1000 MG tablet Take 1,000 mg by mouth 2 times daily (with meals)      vitamin D3 (CHOLECALCIFEROL) 50 mcg (2000 units) tablet Take 1 tablet by mouth 2 times daily (pt not 100% sure of dose)         STOP taking these medications       simvastatin (ZOCOR) 40 MG tablet Comments:   Reason for Stopping:                    Allergies:       No Known Allergies         Consultations This Hospital Stay:      Consultation during this admission received from neurology         Condition and Physical Exam on Discharge:      Discharge condition: Stable   Discharge vitals: Blood pressure 114/67, pulse 82, temperature 97.8  F (36.6  C), temperature source Oral, resp. rate 14, SpO2 100 %.     Constitutional: Alert, awake and oriented X 3; resting comfortably in no apparent distress   HEENT: Pupils equal and reactive to light and accomodation, neck supple; has a fading bruise on right side of chin    Oral cavity: Moist mucosa   Cardiovascular: Normal s1 s2, irregularly irregular rate and rhythm, no murmur   Lungs: B/l clear to auscultation, no wheezes or crepitations   Abdomen: Soft, nt, nd, no guarding, rigidity or rebound; BS +   LE : No edema   Musculoskeletal: Power 5/5 in all extremities   Neuro: No focal neurological deficits noted, CN II to XII grossly intact   Psychiatry: normal mood and affect                 Discharge Orders for Skilled Facility (from Discharge Orders):        After Care Instructions     Activity - Up ad elise       "Advance Diet as Tolerated      Follow this diet upon discharge: Orders Placed This Encounter      Room Service      Combination Diet Regular Diet Adult         General info for SNF      Length of Stay Estimate: Short Term Care: Estimated # of Days <30  Condition at Discharge: Improving  Level of care:skilled   Rehabilitation Potential: Good  Admission H&P remains valid and up-to-date: Yes  Recent Chemotherapy: N/A  Use Nursing Home Standing Orders: Yes         Mantoux instructions      Give two-step Mantoux (PPD) Per Facility Policy Yes             Future tests that were ordered for you     CT Head w/o contrast*               Rehab orders for Skilled Facility (from Discharge Orders):      Referrals     Future Labs/Procedures    NEUROSURGERY REFERRAL     Comments:    Your provider has referred you to: [facility] [phone]     [email or web address]    [facility] follow up for stroke clinic: schedule with stroke specialist ELIAS   in 6-8 weeks at [facility] ([phone]). Referral   placed in discharge orders: note, referral states \"neurosurgery\" but it is   for stroke clinic.    Please be aware that coverage of these services is subject to the terms   and limitations of your health insurance plan.  Call member services at   your health plan with any benefit or coverage questions.      Please bring the following with you to your appointment:    (1) Any X-Rays, CTs or MRIs which have been performed.  Contact the   facility where they were done to arrange for  prior to your   scheduled appointment.   (2) List of current medications  (3) This referral request   (4) Any documents/labs given to you for this referral    Occupational Therapy Adult Consult     Comments:    Evaluate and treat as clinically indicated.    Reason:  Deconditioning, CVA    Physical Therapy Adult Consult     Comments:    Evaluate and treat as clinically " indicated.    Reason:  Deconditioning, CVA             Discharge Time:      Greater than 30 minutes.        Image Results From This Hospital Stay (For Non-EPIC Providers):        Results for orders placed or performed during the hospital encounter of 11/22/20   CT Head Perfusion w Contrast    Narrative    EXAM: CTA  HEAD NECK WITH CONTRAST, CT HEAD W/O CONTRAST, CT HEAD PERFUSION WITH CONTRAST  LOCATION: [facility]  DATE/TIME: 11/22/2020 10:35 PM    INDICATION: Patient found down presenting with left-sided weakness. Unknown time of onset of symptoms.  COMPARISON: None.  TECHNIQUE: Head and neck CT angiogram with IV contrast. Noncontrast head CT followed by axial helical CT images of the head and neck vessels obtained during the arterial phase of intravenous contrast administration. Axial 2D reconstructed images and   multiplanar 3D MIP reconstructed images of the head and neck vessels were performed by the technologist. Additional CT cerebral perfusion was performed utilizing a second contrast bolus. Perfusion data were post processed with generation of standard   perfusion maps and estimation of ischemic/infarcted volumes utilizing standard threshold values. Dose reduction techniques were used.  All stenosis measurements made according to NASCET criteria unless otherwise specified.  CONTRAST: 70 mL Isovue-370 (accession [id number]), 70 mL Isovue-370 (accession [id number]), 50mL Isovue-370 (accession [id number]).  COMPARISON: None.    FINDINGS:   NONCONTRAST HEAD CT:   INTRACRANIAL CONTENTS: No intracranial hemorrhage, extraaxial collection, or mass effect. There is a 2.6 x 1.0 x 2.2 cm zone of low-attenuation change with loss of gray-white differentiation involving the posterior right insular cortex extending   superiorly into the corona radiata and involving the lateral right basal ganglia. No significant mass effect at this time. Adjacent to this, there is a small hyperdense vessel in the right sylvian  fissure. Mild presumed chronic small vessel ischemic   changes. Mild to moderate generalized volume loss. No hydrocephalus.     VISUALIZED ORBITS/SINUSES/MASTOIDS: No intraorbital abnormality. No paranasal sinus mucosal disease. No middle ear or mastoid effusion.    BONES/SOFT TISSUES: No acute abnormality.    HEAD CTA:  ANTERIOR CIRCULATION: There is evidence of occlusion of a right M2/M3 branch vessel in the right sylvian fissure adjacent to the area of recent infarct and hyperdense vessel sign. Initially, approximately 8 mm beyond the origin of the vessel, the vessel   markedly narrows and then there is approximately 1 cm segment of thread-like enhancement which eventually occludes. Remainder of the anterior circulation demonstrates no flow-limiting stenosis or major vessel occlusion. Standard [place] anatomy.    POSTERIOR CIRCULATION: No stenosis/occlusion, aneurysm, or high flow vascular malformation. Dominant right and smaller left vertebral artery contribute to a normal basilar artery.     DURAL VENOUS SINUSES: Expected enhancement of the major dural venous sinuses.    NECK CTA:  RIGHT CAROTID: Mild atheromatous changes. No measurable stenosis or dissection.    LEFT CAROTID: Atherosclerotic plaque results in less than 50% stenosis in the left ICA. No dissection.    VERTEBRAL ARTERIES: No focal stenosis or dissection. Dominant right and smaller left vertebral arteries.    AORTIC ARCH: Classic aortic arch anatomy with no significant stenosis at the origin of the great vessels.    NONVASCULAR STRUCTURES: Unremarkable.    CT PERFUSION:  PERFUSION MAPS: In the region of the posterior right insula and lateral right basal ganglia, corresponding to the abnormality on CT, there is an area demonstrating elevated mean transit time, decreased cerebral blood volume and  decreased cerebral blood   flow. Lateral to this within the mid lateral right temporal lobe, there is a zone demonstrating decreased cerebral  blood flow, elevated mean transit time and relatively normal cerebral blood volume.    RAPID ANALYSIS: CBF<30%: 5 mL  Tmax>6sec: 18 mL  Mismatch volume: 13 mL  Mismatch ratio: 3.6      Impression    IMPRESSION:   HEAD CT:  1.  Evidence of small to moderate acute infarct involving the posterior right insular cortex extending into the lateral right basal ganglia and right corona radiata. No associated hemorrhage or significant mass effect.  2.  Underlying mild presumed chronic small vessel ischemic changes and mild to moderate volume loss.    HEAD CTA:   1.  Evidence of acute thromboembolism and vessel occlusion involving a right M2/M3 inferior division vessel.    NECK CTA:  1.  No hemodynamically significant stenosis in either proximal internal carotid artery based on NASCET criteria.  2.  The vertebral arteries are patent through the neck and into the head.    CT PERFUSION:  1.  Rapid analysis demonstrates a core infarct volume of 5 mL and a mismatch volume of 13 mL. The mismatch ratio is 3.6.    Results were called to Dr. Rizo at 10:57 PM on 11/22/2020.   CT Head w/o Contrast    Narrative    EXAM: CTA  HEAD NECK WITH CONTRAST, CT HEAD W/O CONTRAST, CT HEAD PERFUSION WITH CONTRAST  LOCATION: [facility]  DATE/TIME: 11/22/2020 10:35 PM    INDICATION: Patient found down presenting with left-sided weakness. Unknown time of onset of symptoms.  COMPARISON: None.  TECHNIQUE: Head and neck CT angiogram with IV contrast. Noncontrast head CT followed by axial helical CT images of the head and neck vessels obtained during the arterial phase of intravenous contrast administration. Axial 2D reconstructed images and   multiplanar 3D MIP reconstructed images of the head and neck vessels were performed by the technologist. Additional CT cerebral perfusion was performed utilizing a second contrast bolus. Perfusion data were post processed with generation of standard   perfusion maps and estimation of  ischemic/infarcted volumes utilizing standard threshold values. Dose reduction techniques were used.  All stenosis measurements made according to NASCET criteria unless otherwise specified.  CONTRAST: 70 mL Isovue-370 (accession [id number]), 70 mL Isovue-370 (accession [id number]), 50mL Isovue-370 (accession [id number]).  COMPARISON: None.    FINDINGS:   NONCONTRAST HEAD CT:   INTRACRANIAL CONTENTS: No intracranial hemorrhage, extraaxial collection, or mass effect. There is a 2.6 x 1.0 x 2.2 cm zone of low-attenuation change with loss of gray-white differentiation involving the posterior right insular cortex extending   superiorly into the corona radiata and involving the lateral right basal ganglia. No significant mass effect at this time. Adjacent to this, there is a small hyperdense vessel in the right sylvian fissure. Mild presumed chronic small vessel ischemic   changes. Mild to moderate generalized volume loss. No hydrocephalus.     VISUALIZED ORBITS/SINUSES/MASTOIDS: No intraorbital abnormality. No paranasal sinus mucosal disease. No middle ear or mastoid effusion.    BONES/SOFT TISSUES: No acute abnormality.    HEAD CTA:  ANTERIOR CIRCULATION: There is evidence of occlusion of a right M2/M3 branch vessel in the right sylvian fissure adjacent to the area of recent infarct and hyperdense vessel sign. Initially, approximately 8 mm beyond the origin of the vessel, the vessel   markedly narrows and then there is approximately 1 cm segment of thread-like enhancement which eventually occludes. Remainder of the anterior circulation demonstrates no flow-limiting stenosis or major vessel occlusion. Standard [place] anatomy.    POSTERIOR CIRCULATION: No stenosis/occlusion, aneurysm, or high flow vascular malformation. Dominant right and smaller left vertebral artery contribute to a normal basilar artery.     DURAL VENOUS SINUSES: Expected enhancement of the major dural venous sinuses.    NECK CTA:  RIGHT CAROTID:  Mild atheromatous changes. No measurable stenosis or dissection.    LEFT CAROTID: Atherosclerotic plaque results in less than 50% stenosis in the left ICA. No dissection.    VERTEBRAL ARTERIES: No focal stenosis or dissection. Dominant right and smaller left vertebral arteries.    AORTIC ARCH: Classic aortic arch anatomy with no significant stenosis at the origin of the great vessels.    NONVASCULAR STRUCTURES: Unremarkable.    CT PERFUSION:  PERFUSION MAPS: In the region of the posterior right insula and lateral right basal ganglia, corresponding to the abnormality on CT, there is an area demonstrating elevated mean transit time, decreased cerebral blood volume and  decreased cerebral blood   flow. Lateral to this within the mid lateral right temporal lobe, there is a zone demonstrating decreased cerebral blood flow, elevated mean transit time and relatively normal cerebral blood volume.    RAPID ANALYSIS: CBF<30%: 5 mL  Tmax>6sec: 18 mL  Mismatch volume: 13 mL  Mismatch ratio: 3.6      Impression    IMPRESSION:   HEAD CT:  1.  Evidence of small to moderate acute infarct involving the posterior right insular cortex extending into the lateral right basal ganglia and right corona radiata. No associated hemorrhage or significant mass effect.  2.  Underlying mild presumed chronic small vessel ischemic changes and mild to moderate volume loss.    HEAD CTA:   1.  Evidence of acute thromboembolism and vessel occlusion involving a right M2/M3 inferior division vessel.    NECK CTA:  1.  No hemodynamically significant stenosis in either proximal internal carotid artery based on NASCET criteria.  2.  The vertebral arteries are patent through the neck and into the head.    CT PERFUSION:  1.  Rapid analysis demonstrates a core infarct volume of 5 mL and a mismatch volume of 13 mL. The mismatch ratio is 3.6.    Results were called to Dr. Rizo at 10:57 PM on 11/22/2020.   CTA Head Neck with Contrast    Narrative    EXAM:  CTA  HEAD NECK WITH CONTRAST, CT HEAD W/O CONTRAST, CT HEAD PERFUSION WITH CONTRAST  LOCATION: [facility]  DATE/TIME: 11/22/2020 10:35 PM    INDICATION: Patient found down presenting with left-sided weakness. Unknown time of onset of symptoms.  COMPARISON: None.  TECHNIQUE: Head and neck CT angiogram with IV contrast. Noncontrast head CT followed by axial helical CT images of the head and neck vessels obtained during the arterial phase of intravenous contrast administration. Axial 2D reconstructed images and   multiplanar 3D MIP reconstructed images of the head and neck vessels were performed by the technologist. Additional CT cerebral perfusion was performed utilizing a second contrast bolus. Perfusion data were post processed with generation of standard   perfusion maps and estimation of ischemic/infarcted volumes utilizing standard threshold values. Dose reduction techniques were used.  All stenosis measurements made according to NASCET criteria unless otherwise specified.  CONTRAST: 70 mL Isovue-370 (accession [id number]), 70 mL Isovue-370 (accession [id number]), 50mL Isovue-370 (accession [id number]).  COMPARISON: None.    FINDINGS:   NONCONTRAST HEAD CT:   INTRACRANIAL CONTENTS: No intracranial hemorrhage, extraaxial collection, or mass effect. There is a 2.6 x 1.0 x 2.2 cm zone of low-attenuation change with loss of gray-white differentiation involving the posterior right insular cortex extending   superiorly into the corona radiata and involving the lateral right basal ganglia. No significant mass effect at this time. Adjacent to this, there is a small hyperdense vessel in the right sylvian fissure. Mild presumed chronic small vessel ischemic   changes. Mild to moderate generalized volume loss. No hydrocephalus.     VISUALIZED ORBITS/SINUSES/MASTOIDS: No intraorbital abnormality. No paranasal sinus mucosal disease. No middle ear or mastoid effusion.    BONES/SOFT TISSUES: No acute  abnormality.    HEAD CTA:  ANTERIOR CIRCULATION: There is evidence of occlusion of a right M2/M3 branch vessel in the right sylvian fissure adjacent to the area of recent infarct and hyperdense vessel sign. Initially, approximately 8 mm beyond the origin of the vessel, the vessel   markedly narrows and then there is approximately 1 cm segment of thread-like enhancement which eventually occludes. Remainder of the anterior circulation demonstrates no flow-limiting stenosis or major vessel occlusion. Standard [place] anatomy.    POSTERIOR CIRCULATION: No stenosis/occlusion, aneurysm, or high flow vascular malformation. Dominant right and smaller left vertebral artery contribute to a normal basilar artery.     DURAL VENOUS SINUSES: Expected enhancement of the major dural venous sinuses.    NECK CTA:  RIGHT CAROTID: Mild atheromatous changes. No measurable stenosis or dissection.    LEFT CAROTID: Atherosclerotic plaque results in less than 50% stenosis in the left ICA. No dissection.    VERTEBRAL ARTERIES: No focal stenosis or dissection. Dominant right and smaller left vertebral arteries.    AORTIC ARCH: Classic aortic arch anatomy with no significant stenosis at the origin of the great vessels.    NONVASCULAR STRUCTURES: Unremarkable.    CT PERFUSION:  PERFUSION MAPS: In the region of the posterior right insula and lateral right basal ganglia, corresponding to the abnormality on CT, there is an area demonstrating elevated mean transit time, decreased cerebral blood volume and  decreased cerebral blood   flow. Lateral to this within the mid lateral right temporal lobe, there is a zone demonstrating decreased cerebral blood flow, elevated mean transit time and relatively normal cerebral blood volume.    RAPID ANALYSIS: CBF<30%: 5 mL  Tmax>6sec: 18 mL  Mismatch volume: 13 mL  Mismatch ratio: 3.6      Impression    IMPRESSION:   HEAD CT:  1.  Evidence of small to moderate acute infarct involving the posterior  right insular cortex extending into the lateral right basal ganglia and right corona radiata. No associated hemorrhage or significant mass effect.  2.  Underlying mild presumed chronic small vessel ischemic changes and mild to moderate volume loss.    HEAD CTA:   1.  Evidence of acute thromboembolism and vessel occlusion involving a right M2/M3 inferior division vessel.    NECK CTA:  1.  No hemodynamically significant stenosis in either proximal internal carotid artery based on NASCET criteria.  2.  The vertebral arteries are patent through the neck and into the head.    CT PERFUSION:  1.  Rapid analysis demonstrates a core infarct volume of 5 mL and a mismatch volume of 13 mL. The mismatch ratio is 3.6.    Results were called to Dr. Rizo at 10:57 PM on 11/22/2020.   MRI Brain w & w/o contrast    Narrative    MRI BRAIN WITHOUT AND WITH CONTRAST  11/23/2020 5:49 PM    HISTORY:  left sided weakness     TECHNIQUE:  Multiplanar, multisequence MRI of the brain without and  with 7 mL Gadavist    COMPARISON: CT studies dated 11/22/2020    FINDINGS:     Intracranial contents: Diffusion-weighted images reveal an area of  restricted diffusion in the right insula extending into the right  frontal operculum. This corresponds to the perfusion defect seen on  the Tmax images. Measures approximately 5.8 cm in AP dimension by 3.8  cm in transverse dimension and 3.6 cm in cephalocaudad dimension.  There are areas of susceptibility hypointensity within the areas of  restricted diffusion. This is probably due to petechial hemorrhage.  There are areas of restricted diffusion in the left frontal lobe. This  measures about 1.7 cm in greatest dimension. A small area of  restricted diffusion is also seen in the left occipital lobe. This  measures about 7 mm in greatest dimension.  There are no gadolinium  enhancing lesions. The arteries at the base of the brain and the dural  venous sinuses appear patent.     Sella:  No significant  abnormality accounting for technique.     Orbit: No significant abnormality accounting for technique.      Sinus/mastoids: No significant paranasal sinus mucosal disease. No  significant middle ear or mastoid effusion.    Bones/soft tissues: No aggressive osseous lesion involving the  calvarium, skull base, or visualized upper cervical spine.       Impression    IMPRESSION:    1. Areas of restricted diffusion in the right insula, left frontal  lobe, and left occipital lobe. These are in separate vascular  distributions. This pattern would be consistent with an embolic  process.  2. There are areas of susceptibility hypointensity within the area of  restricted diffusion in the right insula. This is suggestive of  petechial hemorrhage. No gross parenchymal hemorrhage is identified.      ALIZE BUENO MD   CT Head w/o Contrast    Narrative    CT SCAN OF THE HEAD WITHOUT CONTRAST November 28, 2020 9:21 AM     HISTORY: Stroke, follow up.    TECHNIQUE: Axial images of the head and coronal reformations without  IV contrast material. Radiation dose for this scan was reduced using  automated exposure control, adjustment of the mA and/or kV according  to patient size, or iterative reconstruction technique.    COMPARISON: Head CT 11/22/2020, head MRI 11/23/2020.    FINDINGS: Infarct involving the right middle cerebral artery  distribution involving the right posterior frontal lobe, insula, and  anterior temporal lobe demonstrates evolution compared to the prior  exams. Areas of hyperattenuation are present associated with the  infarct suggestive of petechial hemorrhage. Mild mass effect is  present with local sulcal effacement.    Known infarcts involving the left middle frontal gyrus and lateral  precentral gyrus are not appreciably changed. Multiple other smaller  infarcts are not appreciably changed.    The visualized calvarium, tympanic cavities, and mastoid cavities are  unremarkable.      Impression    IMPRESSION:   1.  Evolution of infarct involving the right middle cerebral artery  distribution with areas of hyperattenuation suggestive of petechial  hemorrhage.  2. No appreciable change of multiple smaller known infarcts.    URIAH MERINO MD   Echocardiogram Complete - Bubble study    Narrative    [id number]  [id number]  [id number]  [id number]^CHARLEY^PATRICIA^HANNA           [facility]  Echocardiography Laboratory  [address] [place] [address]        Name: BRANDEN ARMIJO  MRN: [id number]  : 1945  Study Date: 2020 08:03 AM  Age: 75 yrs  Gender: Female  Patient Location: [facility]  Reason For Study: TIA, Atrial Fibrillation  Ordering Physician: PATRICIA WHITEHEAD  Referring Physician: PATRICIA WHITEHEAD  Performed By: LORI Hill     BSA: 1.9 m2  Height: 66 in  Weight: 168 lb  HR: 102  BP: 110/73 mmHg  _____________________________________________________________________________  __        Procedure  Optison (NDC #[id number]) given intravenously. Complete Portable Bubble Echo  Adult.  _____________________________________________________________________________  __        Interpretation Summary     1. Normal left ventricular size and function. Left ventricular ejection  fraction of 60-65%. No segmental wall motion abnormalities noted.  2. The left atrium is mildly dilated. Right atrial size is normal. Negative  Bubble study.  3. No hemodynamically significant valvular disease.  4. Pulmonary artery pressure of 30-35 mmhg based on a RA pressure of 15 mmHg.  5. Elevated RA pressure.  6. The rhythm was atrial fibrillation  No prior study for comparison. Technically difficult study.  _____________________________________________________________________________  __        Left Ventricle  The left ventricle is normal in size. There is normal left ventricular wall  thickness. Left ventricular systolic function is normal. The visual ejection  fraction is estimated at 60-65%. Diastolic function not assessed due to  atrial  fibrillation. No regional wall motion abnormalities noted.     Right Ventricle  The right ventricle is not well visualized.     Atria  The left atrium is mildly dilated. Right atrial size is normal. A contrast  injection (Bubble Study) was performed that was negative for flow across the  interatrial septum.     Mitral Valve  The mitral valve leaflets appear normal. There is no evidence of stenosis,  fluttering, or prolapse. There is mild (1+) mitral regurgitation.        Tricuspid Valve  Normal tricuspid valve. There is mild (1+) tricuspid regurgitation. The right  ventricular systolic pressure is approximated at 20mmHg plus the right atrial  pressure.     Aortic Valve  The aortic valve is trileaflet. No aortic regurgitation is present. No aortic  stenosis is present.     Pulmonic Valve  The pulmonic valve is not well visualized. There is trace pulmonic valvular  regurgitation.     Vessels  The aortic root is normal size. Normal size ascending aorta. IVC diameter >2.1  cm collapsing <50% with sniff suggests a high RA pressure estimated at 15 mmHg  or greater.     Pericardium  There is no pericardial effusion.        Rhythm  The rhythm was atrial fibrillation.  _____________________________________________________________________________  __  MMode/2D Measurements & Calculations  IVSd: 1.1 cm     LVIDd: 3.5 cm  LVIDs: 3.1 cm  LVPWd: 1.2 cm  FS: 12.6 %  LV mass(C)d: 126.0 grams  LV mass(C)dI: 67.9 grams/m2  Ao root diam: 3.2 cm  LA dimension: 4.1 cm  LA/Ao: 1.3  LVOT diam: 1.9 cm  LVOT area: 2.8 cm2  LA Volume (BP): 57.9 ml  LA Volume Indexed (AL/bp): 29.3 ml/m2  RWT: 0.65           Doppler Measurements & Calculations  MV E max beltran: 100.0 cm/sec  MV dec time: 0.14 sec  PA acc time: 0.11 sec  E/E' av.3  Lateral E/e': 6.7  Medial E/e': 7.9           _____________________________________________________________________________  __           Report approved by: Fred So 2020 11:01 AM               Most Recent Lab Results In EPIC (For Non-EPIC Providers):    Most Recent 3 CBC's:  Recent Labs   Lab Test 11/22/20 2226   WBC 9.6   HGB 13.8   MCV 93         Most Recent 3 BMP's:  Recent Labs   Lab Test 11/27/20  1157 11/22/20 2226   NA  --  139   POTASSIUM  --  4.1   CHLORIDE  --  107   CO2  --  22   BUN  --  18   CR 0.97 0.84   ANIONGAP  --  10   KAROLYN  --  9.2   GLC  --  147*     Most Recent 3 Troponin's:  Recent Labs   Lab Test 11/22/20 2226   TROPI <0.015     Most Recent 3 INR's:  Recent Labs   Lab Test 11/22/20 2226   INR 1.14     Most Recent 2 LFT's:  Recent Labs   Lab Test 11/22/20 2226   AST 32   ALT 24   ALKPHOS 47   BILITOTAL 2.0*     Most Recent Cholesterol Panel:  Recent Labs   Lab Test 11/25/20  1839   CHOL 117   LDL 36   HDL 57   TRIG 119     Most Recent 6 Bacteria Isolates From Any Culture (See EPIC Reports for Culture Details):No lab results found.  Most Recent TSH, T4 and HgbA1c:No lab results found.

## 2020-11-30 NOTE — PROVIDER NOTIFICATION
11/29/20 0900   Signing Clinician's Name / Credentials   Signing clinician's name / credentials Emily Bentley OTR/L    Quick Adds   Rehab Discipline OT   ADL Training   Minutes of Treatment 26   Symptoms Noted During/After Treatment none   Treatment Instruction in compensatory methods;ADL training within precautions;Environment structured;Tasks graded to facilitate independence   Treatment Detail OT: Pt supine upon OT arrival, agreeable to therapy. Pt completes morning ADLs standing at sink (oral hygiene, washing face, toileting, washing hands) with SBA. OT engages pt in IADL tasks including making bed and opening shades, pt completes with SBA. Pt reports that balance is still her greatest concern at this time. Pt in chair with tray table, call light, phone, chair alarm on, all needs met at end of session.    Patient Response Able to follow techniques as instructed;Increased independence;Increased tolerance for ADL activity   OT Discharge Planning    OT Discharge Recommendation (DC Rec) Transitional Care Facility   OT Rationale for DC Rec OT: Pt not meeting ARU criteria but is planning on TCU for more therapy prior to returning home. Pt will benefit from further rehab to maximize safety and independence with ADL/IADL prior to returning home as pt does live alone.    Additional Documentation   OT Plan OT: Give pt multiple ADL tasks to complete and encourage recall without cueing, higher level cognitive tasks   Total Session Time   Total Session Time (minutes) 26 minutes

## 2020-12-01 ENCOUNTER — NURSING HOME VISIT (OUTPATIENT)
Dept: GERIATRICS | Facility: CLINIC | Age: 75
End: 2020-12-01
Payer: COMMERCIAL

## 2020-12-01 VITALS
TEMPERATURE: 96.9 F | OXYGEN SATURATION: 98 % | HEART RATE: 59 BPM | RESPIRATION RATE: 18 BRPM | DIASTOLIC BLOOD PRESSURE: 80 MMHG | SYSTOLIC BLOOD PRESSURE: 146 MMHG | WEIGHT: 160 LBS

## 2020-12-01 DIAGNOSIS — E11.59 TYPE 2 DIABETES MELLITUS WITH OTHER CIRCULATORY COMPLICATION, WITHOUT LONG-TERM CURRENT USE OF INSULIN (H): ICD-10-CM

## 2020-12-01 DIAGNOSIS — I48.91 NEW ONSET ATRIAL FIBRILLATION (H): ICD-10-CM

## 2020-12-01 DIAGNOSIS — Z71.89 ADVANCED DIRECTIVES, COUNSELING/DISCUSSION: ICD-10-CM

## 2020-12-01 DIAGNOSIS — I63.9 ACUTE CVA (CEREBROVASCULAR ACCIDENT) (H): Primary | ICD-10-CM

## 2020-12-01 DIAGNOSIS — E78.5 DYSLIPIDEMIA: ICD-10-CM

## 2020-12-01 DIAGNOSIS — R53.81 PHYSICAL DECONDITIONING: ICD-10-CM

## 2020-12-01 PROCEDURE — 99310 SBSQ NF CARE HIGH MDM 45: CPT | Performed by: NURSE PRACTITIONER

## 2020-12-01 NOTE — PROGRESS NOTES
[facility] [place] [facility]  PRIMARY CARE PROVIDER AND CLINIC:  Physician No Ref-Primary, No address on file  Chief Complaint   Patient presents with     Hospital F/U     [facility] Medical Record Number:  [id number]  Place of Service where encounter took place:  [facility] [[id number]]    Abbey Melgoza  is a 75 year old  (1945), admitted to the above facility from  [facility]. Hospital stay 11/22/2020 through 11/30/2020..  Admitted to this facility for  rehab, medical management and nursing care.    HPI:    HPI information obtained from: facility chart records, facility staff, patient report and [facility] chart review.   Brief Summary of Hospital Course:   75-year-old female PMHx DM on metformin, HLD on statin, new a fib hospitalized with acute ischemic stroke. CT head noted small to acute infarct of the posterior right insular cortex extending into the lateral right basal ganglia and right corona radiata.   CTA of the head acute occlusion involving the right M2, M3 inferior division vessel.  CTA of the neck negative.  MRI Brain: areas of restricted diffusion in the right insula, left frontal lobe, and left occipital lobe with pattern consistent with an embolic process; also noted with likely petechial hemorrhage within the area of restricted diffusion in the right insula; no gross parenchymal hemorrhage is identified. ECHO with EF 60-65%. Started ASA, neurology recommend repeat CY one week on 12/6 and ok to start Eliquis if no worsening of the petechial hemorrhage. A fib managed with lopressor, diltiazem.     Updates on Status Since Skilled nursing Admission:   Patient seen for initial TCU visit. Reports doing well. No headaches, dizziness, chest pain, dyspnea, bowel or bladder issues. Minimal left side weakness per patient. PTA lives alone in her own home. Per EMR note BP range 127-146/72-93 and HR 59-88. Sats are 97% on room air. Reviewed POLST: asks to be  DNR/DNI.     CODE STATUS/ADVANCE DIRECTIVES DISCUSSION:   DNR / DNI  Patient's living condition: lives alone  ALLERGIES: Patient has no known allergies.  PAST MEDICAL HISTORY:  has no past medical history on file.  PAST SURGICAL HISTORY:   has no past surgical history on file.  FAMILY HISTORY: family history is not on file.  SOCIAL HISTORY:       Post Discharge Medication Reconciliation Status: discharge medications reconciled, continue medications without change  Current Outpatient Medications   Medication Sig Dispense Refill     aspirin (ASA) 325 MG EC tablet Take 1 tablet (325 mg) by mouth daily       atorvastatin (LIPITOR) 40 MG tablet 1 tablet (40 mg) by Oral or NG Tube route daily       diltiazem ER (TIAZAC) 120 MG 24 hr ER beaded capsule Take 1 capsule (120 mg) by mouth daily       metFORMIN (GLUCOPHAGE) 1000 MG tablet Take 1,000 mg by mouth 2 times daily (with meals)       metoprolol tartrate (LOPRESSOR) 100 MG tablet Take 1 tablet (100 mg) by mouth 2 times daily       vitamin D3 (CHOLECALCIFEROL) 50 mcg (2000 units) tablet Take 1 tablet by mouth 2 times daily (pt not 100% sure of dose)         ROS:  10 point ROS of systems including Constitutional, Eyes, Respiratory, Cardiovascular, Gastroenterology, Genitourinary, Integumentary, Musculoskeletal, Psychiatric were all negative except for pertinent positives noted in my HPI.    Vitals:  BP (!) 146/80   Pulse 59   Temp 96.9  F (36.1  C)   Resp 18   Wt 72.6 kg (160 lb)   SpO2 98%   Exam:  Exam is limited due to COVID-19 precautions  GENERAL APPEARANCE:  Alert, in no distress, cooperative  ENT:  Mouth normal, moist mucous membranes, normal hearing acuity  EYES:  Conjunctiva and lids normal  RESP:  no respiratory distress  NEURO:   No facial asymmetry, speech clear. Minimal left side weakness.  PSYCH:  oriented X 3, affect and mood normal     Lab/Diagnostic data:  Most Recent 3 CBC's:  Recent Labs   Lab Test 11/22/20  2226   WBC 9.6   HGB 13.8   MCV 93   PLT  219     Most Recent 3 BMP's:  Recent Labs   Lab Test 11/30/20  1231 11/27/20  1157 11/22/20  2226   NA  --   --  139   POTASSIUM  --   --  4.1   CHLORIDE  --   --  107   CO2  --   --  22   BUN  --   --  18   CR 1.08* 0.97 0.84   ANIONGAP  --   --  10   KAROLYN  --   --  9.2   GLC  --   --  147*       ASSESSMENT/PLAN:  Acute CVA (cerebrovascular accident) (H)  Acute onset, symptoms resolved. Continue asa - CT of head as planned 12/7 and review with neurology to see if OK to start Eliquis and stop ASA.     New onset atrial fibrillation (H)  New onset, rate controlled with Lopressor and diltiazem no changes. Continue asa for now and may switch to Eliquis next week if OK with neurology.     Type 2 diabetes mellitus with other circulatory complication, without long-term current use of insulin (H)  Chronic, on metformin. Monitor BG twice daily and continue meds as PTA. F/U with ranges next visit.     Dyslipidemia  Chronic, continue asa and Lipitor. Monitor per PCP routine.     Physical deconditioning  Acute with CVA. Therapies - f/u with progress next visit.     Advanced directives, counseling/discussion  Reviewed POLST: DNR/DNI desired by patient.      Orders written by provider at facility  1. DNR/DNI  2. Ensure patient is scheduled for Head CT no contrast on 12/6 or 12/7 as ordered  3. Make neurology f/u visit in 6-8 weeks  4. BG checks BID diagnosis DM  5. Check BMP on 12/4 diagnosis HTN    Total unit/floor time of 36 minutes consisted of the following: Examination of patient, reviewing the record including pertinent labs and imaging, placing orders, and completing documentation.  More than 50% of this time (19 minutes) (>50%) was spent in coordination of care with the patient, nursing staff and other healthcare providers.   This time was spent on discussing the care plan including hospital course, hospital discharge recommendations, recent TCU course and concerns, medications, discharge/safe placement, specialty follow  up need.  Time was also spent on discussing POLST and code status.       Time with patient included: Discussion of diagnostic and imaging test results, diagnosis and prognosis, overall goal and disposition plan.      -Medical decision making and discussions included:  Risks/benefits of statin therapy  PT/OT restrictions to include up with assist until evaluated  DM management plan to include BG checks and metformin  HTN management plan to include monitor vs and continue meds as above    -Rx management plan discussion included:  Follow up needed with CT scan as ordered and neurology as planned  Disposition and discharge plan to include likely need for home care after discharge  Patient education concerning POLST and completion of form    -Time on communication of care included:  Updated RN, RN manager, HUC on above orders and POC    Electronically signed by:  NEGAR Ramirez CNP

## 2020-12-01 NOTE — PROGRESS NOTES
[place] [facility]  INITIAL VISIT NOTE  December 2, 2020    PRIMARY CARE PROVIDER AND CLINIC:  No Ref-Primary, Physician No address on file    CHIEF COMPLAINT:  Hospital follow-up/Initial visit    HPI:    Abbey Melgoza is a 75 year old  (1945) female who was seen at [place] [facility] on December 2, 2020 for an initial visit.     Medical history is notable for DM type II and dyslipidemia.    Summary of hospital course:  Patient was hospitalized at [facility] from November 22 through November 30, 2020 after she was found down on the floor in her home with left-sided weakness, during a welfare check by police, at the request of the patient's daughter.  CBC and CMP were unremarkable.  Blood glucose was 147.  Troponin I was less than 0.015.  Total CK was 659.  UA showed no evidence for infection.  Test for COVID-19 was negative.  EKG demonstrated atrial fibrillation with rapid heart rate of 159 bpm.  CT head revealed evidence of a small to moderate acute infarct involving the posterior right insular cortex extending into the lateral right basal ganglia and right corona radiata as well as underlying mild presumed chronic small vessel ischemic changes and mild to moderate volume loss.  CTA head and neck was significant for acute thromboembolism and vessel occlusion involving the right M2/M3 inferior division vessel.  Brain MRI showed areas of restricted diffusion in the right insula, left frontal lobe, and left occipital lobe as well as areas of susceptibility hypointensity within the area of restricted diffusion in the right insula suggestive of petechial hemorrhage.  Echocardiogram, November 24, revealed normal LV systolic function with EF of 60-65%and elevated RA pressure.  Bubble study was negative.  She was initially started on IV diltiazem drip per protocol for rapid atrial fibrillation.  It subsequently  transitioned to oral medications including diltiazem and metoprolol.  Repeat  CT head on  demonstrated evolution of infarct involving right middle cerebral artery distribution with areas of petechial hemorrhage.  Neurology recommended initiation of apixaban if no evidence of worsening of petechial hemorrhage on repeat CT on 2020.  She was treated with aspirin and continued on prior to admission atorvastatin.  Notably, lipid profile on  showed total cholesterol of 117, HDL of 57, LDL of 36, and triglyceride of 119.  TCU was recommended by therapies.    Patient is admitted to this facility for medical management, nursing care, and rehab.     Today's visit:  Patient was seen in her room, while sitting in a chair comfortably.  She believes that she is somewhat unsteady with ambulation however she reports no focal weakness.  Her speech is normal and she reports no trouble swallowing.  She denies fever, chills, chest pain, palpitation, dyspnea, nausea, vomiting, abdominal pain, or urinary symptoms.  She reports a daily bowel movement.    CODE STATUS:   DNR / DNI    PAST MEDICAL HISTORY:   Right MCA embolic CVA in 2020  Atrial fibrillation, new diagnosis in 2020  DM type II  Dyslipidemia    PAST SURGICAL HISTORY:       FAMILY HISTORY:   Father had a history of DM type II, dementia, CHF, pacemaker, and polio and  at the age of 84.  Mother had a history of cardiomyopathy and  at the age of 84.    SOCIAL HISTORY:  Patient lives alone in a house.  She normally ambulates without any assistive devices.    Social History     Tobacco Use     Smoking status: Not on file   Substance Use Topics     Alcohol use: Not on file       MEDICATIONS:  Current Outpatient Medications   Medication Sig Dispense Refill     aspirin (ASA) 325 MG EC tablet Take 1 tablet (325 mg) by mouth daily       atorvastatin (LIPITOR) 40 MG tablet 1 tablet (40 mg) by Oral or NG Tube route daily       diltiazem ER (TIAZAC) 120 MG 24 hr ER beaded capsule Take 1 capsule (120  mg) by mouth daily       metFORMIN (GLUCOPHAGE) 1000 MG tablet Take 1,000 mg by mouth 2 times daily (with meals)       metoprolol tartrate (LOPRESSOR) 100 MG tablet Take 1 tablet (100 mg) by mouth 2 times daily       vitamin D3 (CHOLECALCIFEROL) 50 mcg (2000 units) tablet Take 1 tablet by mouth 2 times daily (pt not 100% sure of dose)         ALLERGIES:  No Known Allergies    ROS:  10 point ROS neg other than the symptoms noted above in the HPI.    PHYSICAL EXAM:  Vital signs were reviewed in the chart.  Blood pressure 146/80, heart rate 59, respiratory rate 18, temperature 96.9  F, oxygen saturation 98%, breath sounds  Gen: Cooperative and in no acute distress  HEENT: Normocephalic; oropharynx clear  Card: Normal S1, S2, irregularly irregular rhythm, normal rate  Resp: Lungs clear to auscultation bilaterally  GI: Abdomen soft, non-tender, non-distended, +BS  Ext: No LE edema  Neuro: Speech is normal, there is no facial asymmetry.  She is able to protrude her tongue in the midline.  There is no focal deficit on gross examination.  Finger-to-nose test shows no dysmetria bilaterally.  Psych: Alert and oriented x3; normal affect  Skin: No acute rash    LABORATORY/IMAGING DATA:    Most Recent 3 CBC's:  Recent Labs   Lab Test 11/22/20 2226   WBC 9.6   HGB 13.8   MCV 93        Most Recent 3 BMP's:  Recent Labs   Lab Test 11/30/20  1231 11/27/20  1157 11/22/20  2226   NA  --   --  139   POTASSIUM  --   --  4.1   CHLORIDE  --   --  107   CO2  --   --  22   BUN  --   --  18   CR 1.08* 0.97 0.84   ANIONGAP  --   --  10   KAROLYN  --   --  9.2   GLC  --   --  147*     Most Recent 2 LFT's:  Recent Labs   Lab Test 11/22/20 2226   AST 32   ALT 24   ALKPHOS 47   BILITOTAL 2.0*     Most Recent 3 Troponin's:  Recent Labs   Lab Test 11/22/20 2226   TROPI <0.015     Most Recent 3 BNP's:No lab results found.  Most Recent Cholesterol Panel:  Recent Labs   Lab Test 11/25/20  1839   CHOL 117   LDL 36   HDL 57   TRIG 119     Most  Recent TSH and T4:No lab results found.  Most Recent Hemoglobin A1c:No lab results found.  Most Recent 6 glucoses:  Recent Labs   Lab Test 11/22/20  2226   *       ASSESSMENT/PLAN:  Acute right MCA embolic CVA,  Physical deconditioning.  Likely due to new onset atrial fibrillation.    No gross neurologic deficit on today's exam.  Plan:  Continue aspirin 325 mg p.o. daily and atorvastatin 40 mg p.o. at bedtime  Start anticoagulation therapy with apixaban after repeat CT head on December 7, 2020 (day 14 post stroke) and once okay with neuro (will stop aspirin once apixaban initiated)  Continue PT/OT evaluation and therapy  Follow-up with stroke neurology in 6-8 weeks    New onset atrial fibrillation.  Rate is currently controlled.  Plan:  Continue metoprolol 100 mg p.o. twice daily and diltiazem  mg p.o.  daily  Continue aspirin 325 mg p.o. daily for now until initiation of anticoagulation therapy with apixaban as outlined above  Monitor heart rate    Acute kidney injury.  Creatinine increased to 1.08 on November 30.  Creatinine was 0.84 on November 22.  Suspect contrast-induced.  Plan:  Avoid NSAIDs and nephrotoxins  Next [facility] is scheduled for December 4    Dyslipidemia.  Lipid profile on November 25 showed total cholesterol of 117, HDL 57, LDL 36, and triglyceride of 119.    Plan:  Continue atorvastatin 40 mg p.o. at bedtime    DM type II.  Blood glucose is well controlled.  Last hemoglobin A1c was 6.2% November 2019.  Plan:  Continue PTA Metformin 1000 mg p.o. twice daily  Continue diabetic diet  Continue to monitor blood glucose  Check hemoglobin A1c on December 4      Orders written by provider at facility:  Hemoglobin A1c on December 4        Electronically signed by:  Cyndi Mitchell MD

## 2020-12-02 ENCOUNTER — HOSPITAL LABORATORY (OUTPATIENT)
Dept: OTHER | Facility: CLINIC | Age: 75
End: 2020-12-02

## 2020-12-02 ENCOUNTER — NURSING HOME VISIT (OUTPATIENT)
Dept: GERIATRICS | Facility: CLINIC | Age: 75
End: 2020-12-02
Payer: COMMERCIAL

## 2020-12-02 VITALS
HEART RATE: 77 BPM | HEIGHT: 67 IN | RESPIRATION RATE: 18 BRPM | WEIGHT: 160 LBS | TEMPERATURE: 97.8 F | DIASTOLIC BLOOD PRESSURE: 83 MMHG | OXYGEN SATURATION: 96 % | SYSTOLIC BLOOD PRESSURE: 122 MMHG | BODY MASS INDEX: 25.11 KG/M2

## 2020-12-02 DIAGNOSIS — E11.9 TYPE 2 DIABETES MELLITUS WITHOUT COMPLICATION, WITHOUT LONG-TERM CURRENT USE OF INSULIN (H): ICD-10-CM

## 2020-12-02 DIAGNOSIS — I63.411 CEREBROVASCULAR ACCIDENT (CVA) DUE TO EMBOLIC OCCLUSION OF RIGHT MIDDLE CEREBRAL ARTERY (H): ICD-10-CM

## 2020-12-02 DIAGNOSIS — N17.9 ACUTE KIDNEY INJURY (H): ICD-10-CM

## 2020-12-02 DIAGNOSIS — E78.5 DYSLIPIDEMIA: ICD-10-CM

## 2020-12-02 DIAGNOSIS — I48.91 NEW ONSET ATRIAL FIBRILLATION (H): ICD-10-CM

## 2020-12-02 DIAGNOSIS — R53.81 PHYSICAL DECONDITIONING: Primary | ICD-10-CM

## 2020-12-02 PROCEDURE — 99305 1ST NF CARE MODERATE MDM 35: CPT | Mod: AI | Performed by: INTERNAL MEDICINE

## 2020-12-02 ASSESSMENT — MIFFLIN-ST. JEOR: SCORE: 1244.42

## 2020-12-04 ENCOUNTER — HOSPITAL LABORATORY (OUTPATIENT)
Dept: OTHER | Facility: CLINIC | Age: 75
End: 2020-12-04

## 2020-12-04 LAB
ANION GAP SERPL CALCULATED.3IONS-SCNC: 5 MMOL/L (ref 3–14)
BUN SERPL-MCNC: 21 MG/DL (ref 7–30)
CALCIUM SERPL-MCNC: 10.1 MG/DL (ref 8.5–10.1)
CHLORIDE SERPL-SCNC: 105 MMOL/L (ref 94–109)
CO2 SERPL-SCNC: 29 MMOL/L (ref 20–32)
CREAT SERPL-MCNC: 1.1 MG/DL (ref 0.52–1.04)
GFR SERPL CREATININE-BSD FRML MDRD: 49 ML/MIN/{1.73_M2}
GLUCOSE SERPL-MCNC: 128 MG/DL (ref 70–99)
HBA1C MFR BLD: 6.2 % (ref 0–5.6)
POTASSIUM SERPL-SCNC: 4.2 MMOL/L (ref 3.4–5.3)
SARS-COV-2 RNA SPEC QL NAA+PROBE: NOT DETECTED
SODIUM SERPL-SCNC: 139 MMOL/L (ref 133–144)
SPECIMEN SOURCE: NORMAL

## 2020-12-07 ENCOUNTER — HOSPITAL ENCOUNTER (OUTPATIENT)
Dept: CT IMAGING | Facility: CLINIC | Age: 75
Discharge: HOME | End: 2020-12-07
Attending: HOSPITALIST | Admitting: HOSPITALIST
Payer: COMMERCIAL

## 2020-12-07 DIAGNOSIS — I63.9 CEREBROVASCULAR ACCIDENT (CVA), UNSPECIFIED MECHANISM (H): ICD-10-CM

## 2020-12-07 PROCEDURE — 70450 CT HEAD/BRAIN W/O DYE: CPT

## 2020-12-08 ENCOUNTER — NURSING HOME VISIT (OUTPATIENT)
Dept: GERIATRICS | Facility: CLINIC | Age: 75
End: 2020-12-08
Payer: COMMERCIAL

## 2020-12-08 VITALS
RESPIRATION RATE: 18 BRPM | SYSTOLIC BLOOD PRESSURE: 125 MMHG | DIASTOLIC BLOOD PRESSURE: 63 MMHG | WEIGHT: 156 LBS | HEART RATE: 72 BPM | TEMPERATURE: 97.8 F | BODY MASS INDEX: 24.62 KG/M2 | OXYGEN SATURATION: 96 %

## 2020-12-08 DIAGNOSIS — I48.91 NEW ONSET ATRIAL FIBRILLATION (H): ICD-10-CM

## 2020-12-08 DIAGNOSIS — R53.81 PHYSICAL DECONDITIONING: ICD-10-CM

## 2020-12-08 DIAGNOSIS — I63.9 ACUTE CVA (CEREBROVASCULAR ACCIDENT) (H): Primary | ICD-10-CM

## 2020-12-08 DIAGNOSIS — E11.59 TYPE 2 DIABETES MELLITUS WITH OTHER CIRCULATORY COMPLICATION, WITHOUT LONG-TERM CURRENT USE OF INSULIN (H): ICD-10-CM

## 2020-12-08 PROCEDURE — 99309 SBSQ NF CARE MODERATE MDM 30: CPT | Performed by: NURSE PRACTITIONER

## 2020-12-08 NOTE — PROGRESS NOTES
[facility]  [facility] Medical Record Number:  [id number]  Place of Service where encounter took place:  [facility] ([facility]) [[id number]]  Chief Complaint   Patient presents with     Nursing Home Acute       HPI:    Abbey Melgoza  is a 75 year old (1945), who is being seen today for an episodic care visit.  HPI information obtained from: facility chart records, facility staff, patient report and [employer] chart review. Today's concern is:  Patient has been in TCU since 11/30 following hospitalization due to left sided weakness. PMH includes DM2.   She was treated for new onset atrial fibrillation and right MCA embolic CVA. She was started on ASA, diltiazem and metoprolol.   Since in TCU she has been working with therapy. She denies pain, shortness of breath.     Past Medical and Surgical History reviewed in Epic today.    MEDICATIONS:    Current Outpatient Medications   Medication Sig Dispense Refill     aspirin (ASA) 325 MG EC tablet Take 1 tablet (325 mg) by mouth daily       atorvastatin (LIPITOR) 40 MG tablet 1 tablet (40 mg) by Oral or NG Tube route daily       diltiazem ER (TIAZAC) 120 MG 24 hr ER beaded capsule Take 1 capsule (120 mg) by mouth daily       metFORMIN (GLUCOPHAGE) 1000 MG tablet Take 1,000 mg by mouth 2 times daily (with meals)       metoprolol tartrate (LOPRESSOR) 100 MG tablet Take 1 tablet (100 mg) by mouth 2 times daily       vitamin D3 (CHOLECALCIFEROL) 50 mcg (2000 units) tablet Take 1 tablet by mouth 2 times daily (pt not 100% sure of dose)           REVIEW OF SYSTEMS:  4 point ROS including Respiratory, CV, GI and , other than that noted in the HPI,  is negative    Objective:  /63   Pulse 72   Temp 97.8  F (36.6  C)   Resp 18   Wt 70.8 kg (156 lb)   SpO2 96%   BMI 24.62 kg/m    Exam: limited due to covid precautions  GENERAL APPEARANCE:  Alert, in no distress  ENT:  Mouth and posterior oropharynx normal, moist mucous membranes,  hearing acuity adequate   EYES:  EOM, conjunctivae, lids, pupils and irises normal  RESP:  respiratory effort normal, no respiratory distress,   CV:   Edema none    M/S:   Gait and station not observed, Digits and nails normal   SKIN:  Inspection/Palpation of skin and subcutaneous tissue no rash  NEURO: 2-12 in normal limits and at patient's baseline  PSYCH:  insight and judgement, memory intact , affect and mood normal    Labs:   CBC RESULTS:   Recent Labs   Lab Test 11/22/20 2226   WBC 9.6   RBC 4.38   HGB 13.8   HCT 40.8   MCV 93   MCH 31.5   MCHC 33.8   RDW 13.7          Last Basic Metabolic Panel:  Recent Labs   Lab Test 12/04/20  1100 11/30/20  1231 11/22/20 2226 11/22/20 2226     --   --  139   POTASSIUM 4.2  --   --  4.1   CHLORIDE 105  --   --  107   KAROLYN 10.1  --   --  9.2   CO2 29  --   --  22   BUN 21  --   --  18   CR 1.10* 1.08*   < > 0.84   *  --   --  147*    < > = values in this interval not displayed.       Liver Function Studies -   Recent Labs   Lab Test 11/22/20 2226   PROTTOTAL 7.2   ALBUMIN 4.1   BILITOTAL 2.0*   ALKPHOS 47   AST 32   ALT 24         Lab Results   Component Value Date    A1C 6.2 12/04/2020           ASSESSMENT/PLAN:  Acute CVA (cerebrovascular accident) (H)  In TCU following CVA and new onset AF. She did have repeat head CT 12/7. Discussed with Dr Mitchell. Call placed to neurosurgery to ask that someone review results and give advice on starting apixaban.   She has minimal residual effects from stroke. She is making progress with therapy.  -await response from neurology  -continue therapy    New onset atrial fibrillation (H)  Rate controlled with metoprolol and diltiazem  On ASA now until head CT reviewed by neurology    Type 2 diabetes mellitus with other circulatory complication, without long-term current use of insulin (H)  Controlled. BG ~120  -no change in medicaitons    Physical deconditioning  Lives alone in house. Has been independent. SLUMS 30/30  she is walking about 100' with therapy  -continue physical therapy and OCCUPATIONAL THERAPY       Electronically signed by:  NEGAR Vega CNP

## 2020-12-09 ENCOUNTER — HOSPITAL LABORATORY (OUTPATIENT)
Dept: OTHER | Facility: CLINIC | Age: 75
End: 2020-12-09

## 2020-12-09 ENCOUNTER — TELEPHONE (OUTPATIENT)
Facility: CLINIC | Age: 75
End: 2020-12-09

## 2020-12-09 DIAGNOSIS — I63.9 CEREBROVASCULAR ACCIDENT (CVA), UNSPECIFIED MECHANISM (H): Primary | ICD-10-CM

## 2020-12-09 DIAGNOSIS — I48.91 NEW ONSET ATRIAL FIBRILLATION (H): Primary | ICD-10-CM

## 2020-12-09 DIAGNOSIS — I48.91 ATRIAL FIBRILLATION WITH RVR (H): ICD-10-CM

## 2020-12-09 DIAGNOSIS — I63.9 ACUTE CVA (CEREBROVASCULAR ACCIDENT) (H): ICD-10-CM

## 2020-12-09 NOTE — TELEPHONE ENCOUNTER
"Brief neurology note:   Abbey Melgoza is a 75-year-old female who presented to the emergency department on 11/22/2020 after being found down in her home with left-sided weakness who was found to have new onset atrial fibrillation. MRI revealed areas of ischemic stroke in the right insula, left frontal lobe, and left occipital lobe, there were areas suggestive of petechial hemorrhage. Given this finding of petechial hemorrhage, it was recommended to wait on starting anticoagulation for newfound atrial fibrillation until 14 days post stroke. Discussed clinical status of Mrs. Melgoza with staff over at [facility] who states she is doing well. Given stability of CT scan and and Mrs. Melgoza is doing well clinically, okay to start Eliquis.    Plan:  - Start Eliquis 5 mg BID. Refills for this medication to go through PCP.   - Other recommendation per previous neurology note          Roseanne Jane PA-C  Neurology   12/9/2020 at 2:45 PM  To page me or covering stroke neurology team member, click here: AMCOM  Choose \"On Call\" tab at top, then search dropdown box for \"Neurology Adult\" & press Enter, look for Neuro ICU/Stroke    "

## 2020-12-09 NOTE — TELEPHONE ENCOUNTER
Received phone call from [place] regarding results from CT scan and whether or not there is an order to start Eliquis.  Either call at [phone] for an order can be faxed to [phone]

## 2020-12-10 ENCOUNTER — DISCHARGE SUMMARY NURSING HOME (OUTPATIENT)
Dept: GERIATRICS | Facility: CLINIC | Age: 75
End: 2020-12-10
Payer: COMMERCIAL

## 2020-12-10 VITALS
HEART RATE: 80 BPM | OXYGEN SATURATION: 99 % | WEIGHT: 150 LBS | TEMPERATURE: 96.8 F | DIASTOLIC BLOOD PRESSURE: 67 MMHG | SYSTOLIC BLOOD PRESSURE: 109 MMHG | RESPIRATION RATE: 18 BRPM | BODY MASS INDEX: 23.67 KG/M2

## 2020-12-10 DIAGNOSIS — R53.81 PHYSICAL DECONDITIONING: ICD-10-CM

## 2020-12-10 DIAGNOSIS — E11.59 TYPE 2 DIABETES MELLITUS WITH OTHER CIRCULATORY COMPLICATION, WITHOUT LONG-TERM CURRENT USE OF INSULIN (H): ICD-10-CM

## 2020-12-10 DIAGNOSIS — I48.91 ATRIAL FIBRILLATION WITH RVR (H): ICD-10-CM

## 2020-12-10 DIAGNOSIS — I63.9 ACUTE CVA (CEREBROVASCULAR ACCIDENT) (H): Primary | ICD-10-CM

## 2020-12-10 DIAGNOSIS — I63.9 CEREBROVASCULAR ACCIDENT (CVA), UNSPECIFIED MECHANISM (H): ICD-10-CM

## 2020-12-10 DIAGNOSIS — I48.91 NEW ONSET ATRIAL FIBRILLATION (H): ICD-10-CM

## 2020-12-10 LAB
LABORATORY COMMENT REPORT: NORMAL
SARS-COV-2 RNA SPEC QL NAA+PROBE: NEGATIVE
SARS-COV-2 RNA SPEC QL NAA+PROBE: NORMAL
SPECIMEN SOURCE: NORMAL
SPECIMEN SOURCE: NORMAL

## 2020-12-10 PROCEDURE — 99316 NF DSCHRG MGMT 30 MIN+: CPT | Performed by: NURSE PRACTITIONER

## 2020-12-10 RX ORDER — DILTIAZEM HYDROCHLORIDE 120 MG/1
120 CAPSULE, EXTENDED RELEASE ORAL DAILY
Qty: 30 CAPSULE | Refills: 0 | Status: SHIPPED | OUTPATIENT
Start: 2020-12-10

## 2020-12-10 RX ORDER — ATORVASTATIN CALCIUM 40 MG/1
40 TABLET, FILM COATED ORAL DAILY
Qty: 30 TABLET | Refills: 0 | Status: SHIPPED | OUTPATIENT
Start: 2020-12-10

## 2020-12-10 RX ORDER — METOPROLOL TARTRATE 100 MG
100 TABLET ORAL 2 TIMES DAILY
Qty: 60 TABLET | Refills: 0 | Status: SHIPPED | OUTPATIENT
Start: 2020-12-10

## 2020-12-10 NOTE — LETTER
12/10/2020        RE: Abbey Melgoza  [address] [place] [address]        [facility] DISCHARGE SUMMARY  PATIENT'S NAME: Abbey Melgoza  YOB: 1945  MEDICAL RECORD NUMBER:  [id number]  Place of Service where encounter took place:  [facility] - [facility] ([facility]) [[id number]]    PRIMARY CARE PROVIDER AND CLINIC RESPONSIBLE AFTER TRANSFER:   June Das MD   Non-G Provider     Transferring providers: NEGAR Vega CNP, Cyndi Mitchell MD  Recent Hospitalization/ED:  [facility] Hospital stay 11/22/2020 to 11/30/2020.  Date of SNF Admission: November / 30 / 2020  Date of SNF (anticipated) Discharge: December / 12 / 2020  Discharged to: previous independent home  Cognitive Scores: SLUMS: 30/30 and Safety Questionnaire: 22/20  Physical Function: Ambulating 1000 ft with no AD  DME: none    CODE STATUS/ADVANCE DIRECTIVES DISCUSSION:  DNR / DNI   ALLERGIES: Patient has no known allergies.    DISCHARGE DIAGNOSIS/NURSING FACILITY COURSE:   Acute CVA (cerebrovascular accident) (H)  In TCU following CVA and new onset AF. Initial head CT showed areas of petechial hemorrhage therefore anticoagulation held. She did have repeat head CT 12/7. This was reviewed by neurology and recommendations to start apixaban received.   She has minimal residual effects from stroke. She is making progress with therapy.  -apixaban 5mg BID  DISCONTINUE  ASA  -follow up with neurology in one month-appointment made.      New onset atrial fibrillation (H)  Rate controlled with metoprolol and diltiazem       Type 2 diabetes mellitus with other circulatory complication, without long-term current use of insulin (H)  Controlled. BG ~120  -no change in medications     Physical deconditioning  Lives alone in house. Has been independent. SLUMS 30/30 she is walking about 1000' with therapy. She will move to stay by the day at [facility] until more supportive setting can be  arranged.   -continue physical therapy and OCCUPATIONAL THERAPY through  home care.   Past Medical History:  has no past medical history on file.    Discharge Medications:    Current Outpatient Medications   Medication Sig Dispense Refill     apixaban ANTICOAGULANT (ELIQUIS ANTICOAGULANT) 5 MG tablet Take 1 tablet (5 mg) by mouth 2 times daily 60 tablet 0     atorvastatin (LIPITOR) 40 MG tablet Take 1 tablet (40 mg) by mouth daily 30 tablet 0     diltiazem ER (TIAZAC) 120 MG 24 hr ER beaded capsule Take 1 capsule (120 mg) by mouth daily 30 capsule 0     metoprolol tartrate (LOPRESSOR) 100 MG tablet Take 1 tablet (100 mg) by mouth 2 times daily 60 tablet 0     metFORMIN (GLUCOPHAGE) 1000 MG tablet Take 1,000 mg by mouth 2 times daily (with meals)       vitamin D3 (CHOLECALCIFEROL) 50 mcg (2000 units) tablet Take 1 tablet by mouth 2 times daily (pt not 100% sure of dose)         Medication Changes/Rationale:     DISCONTINUE ASA    Start apixaban 5mg BID    Controlled medications sent with patient:   not applicable/none     ROS:   4 point ROS including Respiratory, CV, GI and , other than that noted in the HPI,  is negative    Physical Exam: limited due to covid precautions  Vitals: /67   Pulse 80   Temp 96.8  F (36  C)   Resp 18   Wt 68 kg (150 lb)   SpO2 99%   BMI 23.67 kg/m    BMI= Body mass index is 23.67 kg/m .  GENERAL APPEARANCE:  Alert, in no distress  ENT:  Mouth and posterior oropharynx normal, moist mucous membranes, hearing acuity adequate   EYES:  EOM, conjunctivae, lids, pupils and irises normal  RESP:  respiratory effortnormal, no respiratory distress,   CV:  , Edema none  ABDOMEN:  normal bowel sounds, soft, nontender, no hepatosplenomegaly or other masses  M/S:   Gait and station steady, Digits and nails normal   SKIN:  Inspection/Palpation of skin and subcutaneous tissue no rash  NEURO: 2-12 in normal limits and at patient's baseline  PSYCH:  insight and judgement, memory mild  cognitive impairment  , affect and mood normal     SNF labs: CBC RESULTS:   Recent Labs   Lab Test 11/22/20 2226   WBC 9.6   RBC 4.38   HGB 13.8   HCT 40.8   MCV 93   MCH 31.5   MCHC 33.8   RDW 13.7          Last Basic Metabolic Panel:  Recent Labs   Lab Test 12/04/20  1100 11/30/20  1231 11/22/20  2226 11/22/20  2226     --   --  139   POTASSIUM 4.2  --   --  4.1   CHLORIDE 105  --   --  107   KAROLYN 10.1  --   --  9.2   CO2 29  --   --  22   BUN 21  --   --  18   CR 1.10* 1.08*   < > 0.84   *  --   --  147*    < > = values in this interval not displayed.       Liver Function Studies -   Recent Labs   Lab Test 11/22/20 2226   PROTTOTAL 7.2   ALBUMIN 4.1   BILITOTAL 2.0*   ALKPHOS 47   AST 32   ALT 24       No results found for: TSH]    Lab Results   Component Value Date    A1C 6.2 12/04/2020           DISCHARGE PLAN:    Follow up labs: No labs orders/due    Medical Follow Up:      Follow up with primary care provider in 1 weeks    MTM referral needed and placed by this provider: No    Current [place] scheduled appointments:  Next 5 appointments (look out 90 days)    Jan 26, 2021  1:00 PM  Telephone Visit with TAY Peters [facility] [place] ([facility]) [address] [place] [id number] [phone]           Discharge Services: Home Care:  Occupational Therapy, Physical Therapy, Registered Nurse and Home Health Aide    Discharge Instructions Verbalized to Patient at Discharge:     None      TOTAL DISCHARGE TIME:   Greater than 30 minutes  Electronically signed by:  NEGAR Vega CNP     Home care Face to Face documentation done in EPIC attached to Home care orders for [facility].                       Sincerely,        NEGAR Vega CNP

## 2020-12-10 NOTE — PROGRESS NOTES
[facility] DISCHARGE SUMMARY  PATIENT'S NAME: Abbey Melgoza  YOB: 1945  MEDICAL RECORD NUMBER:  [id number]  Place of Service where encounter took place:   [facility] - [facility] ([facility]) [[id number]]    PRIMARY CARE PROVIDER AND CLINIC RESPONSIBLE AFTER TRANSFER:   June Das MD   Non-FMG Provider     Transferring providers: NEGAR Vega CNP, Cyndi Mitchell MD  Recent Hospitalization/ED:  [facility] Hospital stay 11/22/2020 to 11/30/2020.  Date of SNF Admission: November / 30 / 2020  Date of SNF (anticipated) Discharge: December / 12 / 2020  Discharged to: previous independent home  Cognitive Scores: SLUMS: 30/30 and Safety Questionnaire: 22/20  Physical Function: Ambulating 1000 ft with no AD  DME: none    CODE STATUS/ADVANCE DIRECTIVES DISCUSSION:  DNR / DNI   ALLERGIES: Patient has no known allergies.    DISCHARGE DIAGNOSIS/NURSING FACILITY COURSE:   Acute CVA (cerebrovascular accident) (H)  In TCU following CVA and new onset AF. Initial head CT showed areas of petechial hemorrhage therefore anticoagulation held. She did have repeat head CT 12/7. This was reviewed by neurology and recommendations to start apixaban received.   She has minimal residual effects from stroke. She is making progress with therapy.  -apixaban 5mg BID  DISCONTINUE  ASA  -follow up with neurology in one month-appointment made.      New onset atrial fibrillation (H)  Rate controlled with metoprolol and diltiazem       Type 2 diabetes mellitus with other circulatory complication, without long-term current use of insulin (H)  Controlled. BG ~120  -no change in medications     Physical deconditioning  Lives alone in house. Has been independent. SLUMS 30/30 she is walking about 1000' with therapy. She will move to stay by the day at [facility] until more supportive setting can be arranged.   -continue physical therapy and OCCUPATIONAL THERAPY through  home care.   Past  Medical History:  has no past medical history on file.    Discharge Medications:    Current Outpatient Medications   Medication Sig Dispense Refill     apixaban ANTICOAGULANT (ELIQUIS ANTICOAGULANT) 5 MG tablet Take 1 tablet (5 mg) by mouth 2 times daily 60 tablet 0     atorvastatin (LIPITOR) 40 MG tablet Take 1 tablet (40 mg) by mouth daily 30 tablet 0     diltiazem ER (TIAZAC) 120 MG 24 hr ER beaded capsule Take 1 capsule (120 mg) by mouth daily 30 capsule 0     metoprolol tartrate (LOPRESSOR) 100 MG tablet Take 1 tablet (100 mg) by mouth 2 times daily 60 tablet 0     metFORMIN (GLUCOPHAGE) 1000 MG tablet Take 1,000 mg by mouth 2 times daily (with meals)       vitamin D3 (CHOLECALCIFEROL) 50 mcg (2000 units) tablet Take 1 tablet by mouth 2 times daily (pt not 100% sure of dose)         Medication Changes/Rationale:     DISCONTINUE ASA    Start apixaban 5mg BID    Controlled medications sent with patient:   not applicable/none     ROS:   4 point ROS including Respiratory, CV, GI and , other than that noted in the HPI,  is negative    Physical Exam: limited due to covid precautions  Vitals: /67   Pulse 80   Temp 96.8  F (36  C)   Resp 18   Wt 68 kg (150 lb)   SpO2 99%   BMI 23.67 kg/m    BMI= Body mass index is 23.67 kg/m .  GENERAL APPEARANCE:  Alert, in no distress  ENT:  Mouth and posterior oropharynx normal, moist mucous membranes, hearing acuity adequate   EYES:  EOM, conjunctivae, lids, pupils and irises normal  RESP:  respiratory effortnormal, no respiratory distress,   CV:  , Edema none  ABDOMEN:  normal bowel sounds, soft, nontender, no hepatosplenomegaly or other masses  M/S:   Gait and station steady, Digits and nails normal   SKIN:  Inspection/Palpation of skin and subcutaneous tissue no rash  NEURO: 2-12 in normal limits and at patient's baseline  PSYCH:  insight and judgement, memory mild cognitive impairment  , affect and mood normal     SNF labs: CBC RESULTS:   Recent Labs   Lab Test  11/22/20 2226   WBC 9.6   RBC 4.38   HGB 13.8   HCT 40.8   MCV 93   MCH 31.5   MCHC 33.8   RDW 13.7          Last Basic Metabolic Panel:  Recent Labs   Lab Test 12/04/20  1100 11/30/20  1231 11/22/20 2226 11/22/20 2226     --   --  139   POTASSIUM 4.2  --   --  4.1   CHLORIDE 105  --   --  107   KAROLYN 10.1  --   --  9.2   CO2 29  --   --  22   BUN 21  --   --  18   CR 1.10* 1.08*   < > 0.84   *  --   --  147*    < > = values in this interval not displayed.       Liver Function Studies -   Recent Labs   Lab Test 11/22/20 2226   PROTTOTAL 7.2   ALBUMIN 4.1   BILITOTAL 2.0*   ALKPHOS 47   AST 32   ALT 24       No results found for: TSH]    Lab Results   Component Value Date    A1C 6.2 12/04/2020           DISCHARGE PLAN:    Follow up labs: No labs orders/due    Medical Follow Up:      Follow up with primary care provider in 1 weeks    MTM referral needed and placed by this provider: No    Current [place] scheduled appointments:  Next 5 appointments (look out 90 days)    Jan 26, 2021  1:00 PM  Telephone Visit with TAY Peters [facility] [place] ([facility]) [address] [place] [id number] [phone]           Discharge Services: Home Care:  Occupational Therapy, Physical Therapy, Registered Nurse and Home Health Aide    Discharge Instructions Verbalized to Patient at Discharge:     None      TOTAL DISCHARGE TIME:   Greater than 30 minutes  Electronically signed by:  NEGAR Vega CNP     Home care Face to Face documentation done in [place] attached to Home care orders for [facility].

## 2020-12-29 ENCOUNTER — DOCUMENTATION ONLY (OUTPATIENT)
Facility: CLINIC | Age: 75
End: 2020-12-29

## 2020-12-30 NOTE — PROGRESS NOTES
PROVIDER NOTIFICATION OF MISSED VISIT - No Action Required  TODAY'S DATE:    PROVIDER:  Dr. aDs  NOTIFICATION METHOD (Fax #, Phone #l, EPIC, etc):  EPIC  PATIENT NAME:  Abbey Melgoza  PATIENT ID:  [id number]  :  [birth year]    Medicare Home Health regulation requires [facility] to notify the physician when the plan for visits has been altered.  We have provided fewer visits than ordered.            Missed service:  HA VISIT            Date(s) of missed service:              Reason:  Rhona Potter just call to canceled. She said she can give herself sponge bath until her shower is fix.  She said it's not safe for shower. - Per Aide      The patient has been notified.  Please call our office at [phone] if this is not satisfactory to you.

## 2021-01-26 ENCOUNTER — VIRTUAL VISIT (OUTPATIENT)
Dept: NEUROSURGERY | Facility: CLINIC | Age: 76
End: 2021-01-26
Attending: PHYSICIAN ASSISTANT
Payer: COMMERCIAL

## 2021-01-26 DIAGNOSIS — I63.9 CEREBROVASCULAR ACCIDENT (CVA), UNSPECIFIED MECHANISM (H): Primary | ICD-10-CM

## 2021-01-26 PROCEDURE — 99204 OFFICE O/P NEW MOD 45 MIN: CPT | Mod: 95 | Performed by: PHYSICIAN ASSISTANT

## 2021-01-26 NOTE — PROGRESS NOTES
Abbey is a 75 year old who is being evaluated via a billable video visit.      How would you like to obtain your AVS? Mail a copy  If the video visit is dropped, the invitation should be resent by: phone: [phone]  Will anyone else be joining your video visit? No  {If patient encounters technical issues they should call [phone]    Video-Visit Details    Type of service:  Video Visit  Video Start Time: 1303  Video End Time:1333  Originating Location (pt. Location): Home  Distant Location (provider location):  [facility] [place]   Platform used for Video Visit: Domenico Jane PA-C on 1/26/2021 at 6:05 PM  _____________________________________________________________      Chief Complaint: No chief complaint on file.    History of Present Illness: Abbey Melgoza is a 75 year old female presenting for follow-up for stroke. She was hospitalized at [facility] from 11/22/2020 -11/30/2020.  Prior to the hospital stay, she had a past medical history of hyperlipidemia and type 2 diabetes. She presented to the emergency department via EMS after she was found down in her home with left sided weakness.  Mrs. Melgoza's family was unable to get a hold of her that day, which led to a welfare check.  Of note in the emergency department she was found to be in rapid atrial fibrillation.    Stroke Evaluation summarized:  MRI/Head CT: MRI brain with areas of restricted diffusion in the right insula, left frontal lobe, and left occipital lobe  Intracranial Vascular Imaging: CTA head with R M2/M3 inferior division junction occlusion  Cervical Carotid and Vertebral Artery Vascular Imaging: mild atherosclerotic plaque in the right carotid bifurcation, atherosclerotic plaque with less than 50% stenosis in the left ICA   Echocardiogram: EF 60-65%, mild LAD, negative bubble  EKG/Telemetry: afib with RVR  LDL: 36  A1c: 6.2  Troponin: <0.015  Other testing: Not Applicable     Repeat  imaging revealed small amount of petechial hemorrhagic transformation in the right MCA territory infarct is recommended waiting to start anticoagulation until 14 days post stroke, with repeat head CT prior to reinitiation. Repeat imaging on day 14 was stable, and Ms. Dill was reported to be clinically doing well.  At that time Eliquis 5 mg twice daily was recommended for recurrent stroke prevention. Since being discharged, she reports that she is feeling well no recurrence of symptoms.  She states she is working with her primary care provider on medication management has questions in regards to side effects.  She is taking metoprolol but was told if blood pressure is less than 110 to skip that dose.  She does not endorse current headache, visual disturbances, focal weakness, or changes in sensations.    Impression:   Problem List Items Addressed This Visit        Nervous and Auditory    Cerebrovascular accident (CVA), unspecified mechanism (H) - Primary      75-year-old female here for follow-up of stroke.  Initially found down after a welfare check with left-sided weakness.  In route found to be in new onset atrial fibrillation. Neuroimaging revealed ischemic stroke and multiple vascular territories    Plan:   - Continue Eliquis 5 mg twice daily  - Most recent LDL on file 36, given increased risk of ICH with LDLs of less than 40, recommend decreasing Lipitor to 20 mg daily and rechecking lipid panel at next primary care appointment.  Will not make adjustment at this time, and will defer medication adjustments to primary care provider as Mrs. Gibbs states she has an upcoming appointment.  Appreciate their input.  - Goal BP <130/80 with tighter control associated with decreased overall CV risk, if tolerated  - Clinical trial screening: Not a candidate for any trials at this time  - Follow up in 6 months with stroke clinic    Stroke Education provided.  She will call us with any questions.  For any acute  "neurologic deficits she was advised to  go directly to the hospital rather than call the clinic.    Roseanne Jane PA-C  Neurology  01/26/2021 7:49 AM  To page me or covering stroke neurology team member, click here: AMCOM  Choose \"On Call\" tab at top, then search dropdown box for \"Neurology Adult\" & press Enter, look for Neuro ICU/Stroke    Current Medications  Current Outpatient Medications   Medication Sig     apixaban ANTICOAGULANT (ELIQUIS ANTICOAGULANT) 5 MG tablet Take 1 tablet (5 mg) by mouth 2 times daily     atorvastatin (LIPITOR) 40 MG tablet Take 1 tablet (40 mg) by mouth daily     diltiazem ER (TIAZAC) 120 MG 24 hr ER beaded capsule Take 1 capsule (120 mg) by mouth daily     metFORMIN (GLUCOPHAGE) 1000 MG tablet Take 1,000 mg by mouth 2 times daily (with meals)     metoprolol tartrate (LOPRESSOR) 100 MG tablet Take 1 tablet (100 mg) by mouth 2 times daily     vitamin D3 (CHOLECALCIFEROL) 50 mcg (2000 units) tablet Take 1 tablet by mouth 2 times daily (pt not 100% sure of dose)     No current facility-administered medications for this visit.      Past Medical History  Past Medical History:   Diagnosis Date     Atrial fibrillation (H)      Diabetes (H)     Type 2     Social History  Social History     Tobacco Use     Smoking status: Never Smoker   Substance Use Topics     Alcohol use: Not on file     Drug use: Not on file     Family History  No family history on file.    ROS: 10 point relevant ROS neg other than the symptoms noted above in the HPI.    Physical Exam    There were no vitals taken for this visit.    General:  no acute distress  HEENT:  normocephalic/atraumatic  Pulmonary:  no respiratory distress    Neurologic  Mental Status: alert, oriented x 3, follows commands, speech clear and fluent  Cranial Nerves: EOMI with normal smooth pursuit, facial movements symmetric, hearing not formally tested but intact to conversation   Motor:  no abnormal movements, able to move all limbs antigravity " spontaneously with no signs of hemiparesis observed, no pronator drift  Reflexes:  unable to test (telestroke)  Sensory:  unable to test (telestroke)  Coordination:  normal finger-to-nose without dysmetria  Station/Gait:  unable to test (telestroke)    Neuroimaging: as per HPI. I personally reviewed those images    Labs:    Recent Labs   Lab Test 11/22/20 2226   INR 1.14        Recent Labs   Lab Test 11/25/20  1839   CHOL 117   HDL 57   LDL 36   TRIG 119       Recent Labs   Lab Test 12/04/20  1100   A1C 6.2*       Recent Labs   Lab Test 11/22/20 2226   TROPI <0.015

## 2021-01-26 NOTE — LETTER
1/26/2021         RE: Abbey Melgoza  [address]  [facility] [address]        Dear Colleague,    Thank you for referring your patient, Abbey Melgoza, to the [facility] NEUROSURGERY [facility]. Please see a copy of my visit note below.    Abbey is a 75 year old who is being evaluated via a billable video visit.      How would you like to obtain your AVS? Mail a copy  If the video visit is dropped, the invitation should be resent by: phone: [phone]  Will anyone else be joining your video visit? No  {If patient encounters technical issues they should call [phone]    Video-Visit Details    Type of service:  Video Visit  Video Start Time: 1303  Video End Time:1333  Originating Location (pt. Location): Home  Distant Location (provider location):  [facility] NEUROSURGERY [facility]   Platform used for Video Visit: Domenico Jane PA-C on 1/26/2021 at 6:05 PM  _____________________________________________________________      Chief Complaint: No chief complaint on file.    History of Present Illness: Abbey Melgoza is a 75 year old female presenting for follow-up for stroke. She was hospitalized at [facility] from 11/22/2020 -11/30/2020.  Prior to the hospital stay, she had a past medical history of hyperlipidemia and type 2 diabetes. She presented to the emergency department via EMS after she was found down in her home with left sided weakness.  Mrs. Melgoza's family was unable to get a hold of her that day, which led to a welfare check.  Of note in the emergency department she was found to be in rapid atrial fibrillation.    Stroke Evaluation summarized:  MRI/Head CT: MRI brain with areas of restricted diffusion in the right insula, left frontal lobe, and left occipital lobe  Intracranial Vascular Imaging: CTA head with R M2/M3 inferior division junction occlusion  Cervical Carotid and Vertebral Artery Vascular Imaging: mild atherosclerotic plaque in  the right carotid bifurcation, atherosclerotic plaque with less than 50% stenosis in the left ICA   Echocardiogram: EF 60-65%, mild LAD, negative bubble  EKG/Telemetry: afib with RVR  LDL: 36  A1c: 6.2  Troponin: <0.015  Other testing: Not Applicable     Repeat imaging revealed small amount of petechial hemorrhagic transformation in the right MCA territory infarct is recommended waiting to start anticoagulation until 14 days post stroke, with repeat head CT prior to reinitiation. Repeat imaging on day 14 was stable, and Ms. Dill was reported to be clinically doing well.  At that time Eliquis 5 mg twice daily was recommended for recurrent stroke prevention. Since being discharged, she reports that she is feeling well no recurrence of symptoms.  She states she is working with her primary care provider on medication management has questions in regards to side effects.  She is taking metoprolol but was told if blood pressure is less than 110 to skip that dose.  She does not endorse current headache, visual disturbances, focal weakness, or changes in sensations.    Impression:   Problem List Items Addressed This Visit        Nervous and Auditory    Cerebrovascular accident (CVA), unspecified mechanism (H) - Primary      75-year-old female here for follow-up of stroke.  Initially found down after a welfare check with left-sided weakness.  In route found to be in new onset atrial fibrillation. Neuroimaging revealed ischemic stroke and multiple vascular territories    Plan:   - Continue Eliquis 5 mg twice daily  - Most recent LDL on file 36, given increased risk of ICH with LDLs of less than 40, recommend decreasing Lipitor to 20 mg daily and rechecking lipid panel at next primary care appointment.  Will not make adjustment at this time, and will defer medication adjustments to primary care provider as Mrs. Gibbs states she has an upcoming appointment.  Appreciate their input.  - Goal BP <130/80 with tighter control  "associated with decreased overall CV risk, if tolerated  - Clinical trial screening: Not a candidate for any trials at this time  - Follow up in 6 months with stroke clinic    Stroke Education provided.  She will call us with any questions.  For any acute neurologic deficits she was advised to  go directly to the hospital rather than call the clinic.    Roseanne Jane PA-C  Neurology  01/26/2021 7:49 AM  To page me or covering stroke neurology team member, click here: [facility]  Choose \"On Call\" tab at top, then search dropdown box for \"Neurology Adult\" & press Enter, look for Neuro ICU/Stroke    Current Medications  Current Outpatient Medications   Medication Sig     apixaban ANTICOAGULANT (ELIQUIS ANTICOAGULANT) 5 MG tablet Take 1 tablet (5 mg) by mouth 2 times daily     atorvastatin (LIPITOR) 40 MG tablet Take 1 tablet (40 mg) by mouth daily     diltiazem ER (TIAZAC) 120 MG 24 hr ER beaded capsule Take 1 capsule (120 mg) by mouth daily     metFORMIN (GLUCOPHAGE) 1000 MG tablet Take 1,000 mg by mouth 2 times daily (with meals)     metoprolol tartrate (LOPRESSOR) 100 MG tablet Take 1 tablet (100 mg) by mouth 2 times daily     vitamin D3 (CHOLECALCIFEROL) 50 mcg (2000 units) tablet Take 1 tablet by mouth 2 times daily (pt not 100% sure of dose)     No current facility-administered medications for this visit.      Past Medical History  Past Medical History:   Diagnosis Date     Atrial fibrillation (H)      Diabetes (H)     Type 2     Social History  Social History     Tobacco Use     Smoking status: Never Smoker   Substance Use Topics     Alcohol use: Not on file     Drug use: Not on file     Family History  No family history on file.    ROS: 10 point relevant ROS neg other than the symptoms noted above in the HPI.    Physical Exam    There were no vitals taken for this visit.    General:  no acute distress  HEENT:  normocephalic/atraumatic  Pulmonary:  no respiratory distress    Neurologic  Mental Status: alert, oriented " x 3, follows commands, speech clear and fluent  Cranial Nerves: EOMI with normal smooth pursuit, facial movements symmetric, hearing not formally tested but intact to conversation   Motor:  no abnormal movements, able to move all limbs antigravity spontaneously with no signs of hemiparesis observed, no pronator drift  Reflexes:  unable to test (telestroke)  Sensory:  unable to test (telestroke)  Coordination:  normal finger-to-nose without dysmetria  Station/Gait:  unable to test (telestroke)    Neuroimaging: as per HPI. I personally reviewed those images    Labs:    Recent Labs   Lab Test 11/22/20 2226   INR 1.14        Recent Labs   Lab Test 11/25/20  1839   CHOL 117   HDL 57   LDL 36   TRIG 119       Recent Labs   Lab Test 12/04/20  1100   A1C 6.2*       Recent Labs   Lab Test 11/22/20 2226   TROPI <0.015       Again, thank you for allowing me to participate in the care of your patient.        Sincerely,        Roseanne Jane PA-C

## 2021-05-24 ENCOUNTER — TELEPHONE (OUTPATIENT)
Dept: NEUROLOGY | Facility: CLINIC | Age: 76
End: 2021-05-24

## 2021-05-24 NOTE — TELEPHONE ENCOUNTER
LVM for patient to call back to schedule follow up appointment for July with Stroke [facility] clinic. Recall is active in patient's chart for when patient calls back.

## 2021-11-10 NOTE — PROVIDER NOTIFICATION
"Paged Dr. Carlson, \"Just wanted to clarify Eliquis starting today. Per Maren note from stroke on 11/24--\"waiting to start anticoagulation until day 7 post-stroke (11/29/20) with repeat CTH prior to initiation\". Let me know. Thanks\"    ADDENDUM 1: Orders changed per Dr. Garcia    ADDENDUM 2: Paged neuro fellow, \"Maren rec starting Eliquis the 29th with CT prior. Pt ready to discharge tomorrow vs Saturday. Wanting to clarify ok to get CT 2 days prior to her starting the eliquis. \"  \  ADDENDUM 3: Neuro fellow called back. Stated he will look at her scans & will call back with update.   " Stat ECG was performed on 11/10/21 at 1212.  ECG was given to Azucena GREGG
